# Patient Record
Sex: FEMALE | Race: WHITE | HISPANIC OR LATINO | Employment: UNEMPLOYED | URBAN - METROPOLITAN AREA
[De-identification: names, ages, dates, MRNs, and addresses within clinical notes are randomized per-mention and may not be internally consistent; named-entity substitution may affect disease eponyms.]

---

## 2018-10-23 ENCOUNTER — TELEPHONE (OUTPATIENT)
Dept: PLASTIC SURGERY | Facility: CLINIC | Age: 38
End: 2018-10-23

## 2018-10-23 NOTE — TELEPHONE ENCOUNTER
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     ----- Message from Kylee Reynaga sent at 10/23/2018  4:27 PM CDT -----  Contact: pt's  "Alton" [phone]  Pt's  called after [facility] gave him Dr Moore's name for his wife to have a breast reduction.  He is asking for a call to let him know either way if this is a possibility or not.  [phone]    Thanks  eliud    "

## 2020-06-24 ENCOUNTER — TELEPHONE (OUTPATIENT)
Dept: PODIATRY | Facility: CLINIC | Age: 40
End: 2020-06-24

## 2020-06-24 NOTE — TELEPHONE ENCOUNTER
----- Message from Carolina Waters sent at 6/24/2020  1:24 PM CDT -----  Regarding: sending referral [facility]  Contact: Alton/  Type: Needs Medical Advice  Who Called:  Alton  Uriel Call Back Number: [phone]  Additional Information: Patient has Authorization from  [facility]#[id number]. Patient will have Referral faxed to [phone].  Please call to advise and schedule.  Thanks!

## 2020-06-24 NOTE — TELEPHONE ENCOUNTER
Advised pt's  once the referral with approved authorizations was received in the office we will call and schedule appointment. He verbalized understanding

## 2020-07-08 ENCOUNTER — OFFICE VISIT (OUTPATIENT)
Dept: PODIATRY | Facility: CLINIC | Age: 40
End: 2020-07-08
Payer: OTHER GOVERNMENT

## 2020-07-08 VITALS
RESPIRATION RATE: 19 BRPM | OXYGEN SATURATION: 98 % | DIASTOLIC BLOOD PRESSURE: 67 MMHG | SYSTOLIC BLOOD PRESSURE: 110 MMHG | HEIGHT: 66 IN | WEIGHT: 137 LBS | HEART RATE: 83 BPM | BODY MASS INDEX: 22.02 KG/M2 | TEMPERATURE: 98 F

## 2020-07-08 DIAGNOSIS — M72.2 PLANTAR FASCIITIS: Primary | ICD-10-CM

## 2020-07-08 PROCEDURE — 99203 OFFICE O/P NEW LOW 30 MIN: CPT | Mod: S$PBB,,, | Performed by: PODIATRIST

## 2020-07-08 PROCEDURE — 99203 PR OFFICE/OUTPT VISIT, NEW, LEVL III, 30-44 MIN: ICD-10-PCS | Mod: S$PBB,,, | Performed by: PODIATRIST

## 2020-07-08 PROCEDURE — 99999 PR PBB SHADOW E&M-EST. PATIENT-LVL III: CPT | Mod: PBBFAC,,, | Performed by: PODIATRIST

## 2020-07-08 PROCEDURE — 99999 PR PBB SHADOW E&M-EST. PATIENT-LVL III: ICD-10-PCS | Mod: PBBFAC,,, | Performed by: PODIATRIST

## 2020-07-08 PROCEDURE — 99213 OFFICE O/P EST LOW 20 MIN: CPT | Mod: PBBFAC | Performed by: PODIATRIST

## 2020-07-08 RX ORDER — DICLOFENAC SODIUM 75 MG/1
75 TABLET, DELAYED RELEASE ORAL 2 TIMES DAILY
Qty: 60 TABLET | Refills: 1 | Status: SHIPPED | OUTPATIENT
Start: 2020-07-08 | End: 2020-08-06 | Stop reason: ALTCHOICE

## 2020-07-08 RX ORDER — METHYLPREDNISOLONE 4 MG/1
TABLET ORAL
COMMUNITY
Start: 2020-05-28 | End: 2020-08-06

## 2020-07-08 RX ORDER — AMITRIPTYLINE HYDROCHLORIDE 25 MG/1
TABLET, FILM COATED ORAL
COMMUNITY
Start: 2020-06-17

## 2020-07-11 NOTE — PROGRESS NOTES
Subjective:       Patient ID: Brissa Araujo is a 40 y.o. female.    Chief Complaint: Foot Pain (mostly in rt foot but lt foot is starting to hurt as well)   Patient presents today she is complaining of bilateral foot pain right greater than left she states that this has been bothering her since 2015 however it got significantly worse in March patient states she is a runner she does like to exercise and work out she has not been running in quite a while because of this discomfort she states that she was on oral steroids this did not help she had x-rays of the area she has tried ice elevation stretching rolling a cold water bottle underneath her arch none of this has really helped.  Patient has tried Motrin and naproxen all of which has not helped.    History reviewed. No pertinent past medical history.  History reviewed. No pertinent surgical history.  Family History   Problem Relation Age of Onset    Diabetes Father      Social History     Socioeconomic History    Marital status:      Spouse name: Not on file    Number of children: Not on file    Years of education: Not on file    Highest education level: Not on file   Occupational History    Not on file   Social Needs    Financial resource strain: Not on file    Food insecurity     Worry: Not on file     Inability: Not on file    Transportation needs     Medical: Not on file     Non-medical: Not on file   Tobacco Use    Smoking status: Never Smoker    Smokeless tobacco: Never Used   Substance and Sexual Activity    Alcohol use: Yes     Comment: occaisonally    Drug use: Never    Sexual activity: Not Currently   Lifestyle    Physical activity     Days per week: Not on file     Minutes per session: Not on file    Stress: Not on file   Relationships    Social connections     Talks on phone: Not on file     Gets together: Not on file     Attends Spiritism service: Not on file     Active member of club or organization: Not on file     Attends  "meetings of clubs or organizations: Not on file     Relationship status: Not on file   Other Topics Concern    Not on file   Social History Narrative    Not on file       Current Outpatient Medications   Medication Sig Dispense Refill    amitriptyline (ELAVIL) 25 MG tablet       methylPREDNISolone (MEDROL DOSEPACK) 4 mg tablet       diclofenac (VOLTAREN) 75 MG EC tablet Take 1 tablet (75 mg total) by mouth 2 (two) times daily. 60 tablet 1     No current facility-administered medications for this visit.      Review of patient's allergies indicates:  No Known Allergies    Review of Systems   Musculoskeletal: Positive for arthralgias.   All other systems reviewed and are negative.      Objective:      Vitals:    07/08/20 1000   BP: 110/67   Pulse: 83   Resp: 19   Temp: 98 °F (36.7 °C)   TempSrc: Temporal   SpO2: 98%   Weight: 62.1 kg (137 lb)   Height: 5' 6" (1.676 m)     Physical Exam  Vitals signs and nursing note reviewed.   Constitutional:       Appearance: Normal appearance.   Cardiovascular:      Pulses:           Dorsalis pedis pulses are 2+ on the right side and 2+ on the left side.        Posterior tibial pulses are 2+ on the right side and 2+ on the left side.   Pulmonary:      Effort: Pulmonary effort is normal.   Musculoskeletal: Normal range of motion.        Feet:    Feet:      Right foot:      Protective Sensation: 2 sites tested. 2 sites sensed.      Skin integrity: Erythema and warmth present.      Left foot:      Protective Sensation: 2 sites tested. 2 sites sensed.      Skin integrity: Erythema and warmth present.   Skin:     General: Skin is warm.   Neurological:      General: No focal deficit present.      Mental Status: She is alert.   Psychiatric:         Mood and Affect: Mood normal.         Behavior: Behavior normal.         Thought Content: Thought content normal.         Judgment: Judgment normal.              Assessment:       1. Plantar fasciitis        Plan:        Patient presents " today she is complaining of bilateral foot pain right greater than left she states that this has been bothering her since 2015 however it got significantly worse in March patient states she is a runner she does like to exercise and work out she has not been running in quite a while because of this discomfort she states that she was on oral steroids this did not help she had x-rays of the area she has tried ice elevation stretching rolling a cold water bottle underneath her arch none of this has really helped.  Patient has tried Motrin and naproxen all of which has not helped.  On evaluation patient does have significantly elevated arches both weight-bearing and nonweightbearing bilateral she states that she does have pain all day long it is especially bad after she has been sitting or when she 1st gets up in the morning.  Following a lengthy evaluation and discussion patient does have findings consistent with plantar fasciitis bilateral obviously this has been going on for quite a while she has not gotten the proper treatment I have recommended appropriate arch supports I have recommended power step arch supports control to start I have also started the patient on diclofenac I have advised her this increased arch is going to make a difference reducing or inflammation and her pain she may need additional support we will re-evaluate her when I follow up with her in 3 weeks.  Patient advised how to take the diclofenac as directed if she has any problems questions or concerns prior to her scheduled follow-up she is to contact me.  Total face-to-face time including discussion evaluation treatment equaled 30 min.  X-rays were not taken today I explained to the patient this is a soft tissue problem with the plantar fascial ligament and x-rays would not change my treatment course at this time.This note was created using [employer] voice recognition software that occasionally misinterpreted phrases or words.

## 2020-07-29 ENCOUNTER — OFFICE VISIT (OUTPATIENT)
Dept: PODIATRY | Facility: CLINIC | Age: 40
End: 2020-07-29
Payer: OTHER GOVERNMENT

## 2020-07-29 VITALS
OXYGEN SATURATION: 99 % | DIASTOLIC BLOOD PRESSURE: 64 MMHG | SYSTOLIC BLOOD PRESSURE: 99 MMHG | TEMPERATURE: 97 F | HEART RATE: 72 BPM | RESPIRATION RATE: 16 BRPM

## 2020-07-29 DIAGNOSIS — M72.2 PLANTAR FASCIITIS: Primary | ICD-10-CM

## 2020-07-29 PROCEDURE — 99213 OFFICE O/P EST LOW 20 MIN: CPT | Mod: PBBFAC | Performed by: PODIATRIST

## 2020-07-29 PROCEDURE — 99214 PR OFFICE/OUTPT VISIT, EST, LEVL IV, 30-39 MIN: ICD-10-PCS | Mod: S$PBB,,, | Performed by: PODIATRIST

## 2020-07-29 PROCEDURE — 99999 PR PBB SHADOW E&M-EST. PATIENT-LVL III: CPT | Mod: PBBFAC,,, | Performed by: PODIATRIST

## 2020-07-29 PROCEDURE — 99999 PR PBB SHADOW E&M-EST. PATIENT-LVL III: ICD-10-PCS | Mod: PBBFAC,,, | Performed by: PODIATRIST

## 2020-07-29 PROCEDURE — 99214 OFFICE O/P EST MOD 30 MIN: CPT | Mod: S$PBB,,, | Performed by: PODIATRIST

## 2020-08-02 NOTE — PROGRESS NOTES
Subjective:       Patient ID: Brissa Araujo is a 40 y.o. female.    Chief Complaint: Plantar Fasciitis (bilat)   Patient presents today she is complaining of bilateral foot pain right greater than left she states that this has been bothering her since 2015 however it got significantly worse in March patient states she is a runner she does like to exercise and work out she has not been running in quite a while because of this discomfort she states that she was on oral steroids this did not help she had x-rays of the area she has tried ice elevation stretching rolling a cold water bottle underneath her arch none of this has really helped.  Patient relates only 20% improvement.    History reviewed. No pertinent past medical history.  History reviewed. No pertinent surgical history.  Family History   Problem Relation Age of Onset    Diabetes Father      Social History     Socioeconomic History    Marital status:      Spouse name: Not on file    Number of children: Not on file    Years of education: Not on file    Highest education level: Not on file   Occupational History    Not on file   Social Needs    Financial resource strain: Not on file    Food insecurity     Worry: Not on file     Inability: Not on file    Transportation needs     Medical: Not on file     Non-medical: Not on file   Tobacco Use    Smoking status: Never Smoker    Smokeless tobacco: Never Used   Substance and Sexual Activity    Alcohol use: Yes     Comment: occaisonally    Drug use: Never    Sexual activity: Not Currently   Lifestyle    Physical activity     Days per week: Not on file     Minutes per session: Not on file    Stress: Not on file   Relationships    Social connections     Talks on phone: Not on file     Gets together: Not on file     Attends [employer] service: Not on file     Active member of club or organization: Not on file     Attends meetings of clubs or organizations: Not on file     Relationship status:  Not on file   Other Topics Concern    Not on file   Social History Narrative    Not on file       Current Outpatient Medications   Medication Sig Dispense Refill    amitriptyline (ELAVIL) 25 MG tablet       diclofenac (VOLTAREN) 75 MG EC tablet Take 1 tablet (75 mg total) by mouth 2 (two) times daily. 60 tablet 1    methylPREDNISolone (MEDROL DOSEPACK) 4 mg tablet        No current facility-administered medications for this visit.      Review of patient's allergies indicates:  No Known Allergies    Review of Systems   Musculoskeletal: Positive for arthralgias.   All other systems reviewed and are negative.      Objective:      Vitals:    07/29/20 0959   BP: 99/64   BP Location: Right arm   Patient Position: Sitting   BP Method: Medium (Automatic)   Pulse: 72   Resp: 16   Temp: 97.4 °F (36.3 °C)   TempSrc: Tympanic   SpO2: 99%     Physical Exam  Vitals signs and nursing note reviewed.   Constitutional:       Appearance: Normal appearance.   Cardiovascular:      Pulses:           Dorsalis pedis pulses are 2+ on the right side and 2+ on the left side.        Posterior tibial pulses are 2+ on the right side and 2+ on the left side.   Pulmonary:      Effort: Pulmonary effort is normal.   Musculoskeletal: Normal range of motion.        Feet:    Feet:      Right foot:      Protective Sensation: 2 sites tested. 2 sites sensed.      Skin integrity: Erythema and warmth present.      Left foot:      Protective Sensation: 2 sites tested. 2 sites sensed.      Skin integrity: Erythema and warmth present.   Skin:     General: Skin is warm.   Neurological:      General: No focal deficit present.      Mental Status: She is alert.   Psychiatric:         Mood and Affect: Mood normal.         Behavior: Behavior normal.         Thought Content: Thought content normal.         Judgment: Judgment normal.              Assessment:       1. Plantar fasciitis        Plan:        Patient presents for follow-up today she is complaining of  bilateral foot pain right greater than left she states that this has been bothering her since 2015 however it got significantly worse in March patient states she is a runner she does like to exercise and work out she has not been running in quite a while because of this discomfort she states that she was on oral steroids this did not help she had x-rays of the area she has tried ice elevation stretching rolling a cold water bottle underneath her arch none of this has really helped.    Patient relates only a 20% improvement she is taking the diclofenac as directed she is wearing the arch supports all the time she states she has been walking about 3 miles not having much discomfort however after that she is having significant increased discomfort and pain.  I have advised the patient obviously the power steps are not giving her enough arch support I did add additional blue arch padding to the plantar arch bilateral want see how the patient does with this I have given her extra pads and shown her where to apply these to the dorsal arch if needed I would advise her to try to get used to the plantar arch support 1st before adding any additional patient was in understanding and agreement with this today.  Patient will continue diclofenac try the increased arch support if she is not doing a lot better over the next few weeks she is to contact me for further evaluation and treatment I did discuss the possibility that she may need custom-molded orthotics to get her the appropriate support that she needs but will see how she does with the additional over-the-counter arch support 1st.  Face-to-face time equaled 25 min.  Additions made to the patient's arch support with additional adjustments.  This note was created using [employer] voice recognition software that occasionally misinterpreted phrases or words.

## 2020-08-06 ENCOUNTER — OFFICE VISIT (OUTPATIENT)
Dept: PODIATRY | Facility: CLINIC | Age: 40
End: 2020-08-06
Payer: OTHER GOVERNMENT

## 2020-08-06 VITALS
SYSTOLIC BLOOD PRESSURE: 110 MMHG | BODY MASS INDEX: 21.69 KG/M2 | HEART RATE: 77 BPM | HEIGHT: 66 IN | WEIGHT: 135 LBS | TEMPERATURE: 98 F | DIASTOLIC BLOOD PRESSURE: 60 MMHG

## 2020-08-06 DIAGNOSIS — M79.672 HEEL PAIN, BILATERAL: ICD-10-CM

## 2020-08-06 DIAGNOSIS — M72.2 PLANTAR FASCIITIS: Primary | ICD-10-CM

## 2020-08-06 DIAGNOSIS — M79.671 HEEL PAIN, BILATERAL: ICD-10-CM

## 2020-08-06 PROCEDURE — 96372 THER/PROPH/DIAG INJ SC/IM: CPT | Mod: PBBFAC,PN

## 2020-08-06 PROCEDURE — 99999 PR PBB SHADOW E&M-EST. PATIENT-LVL III: CPT | Mod: PBBFAC,,, | Performed by: PODIATRIST

## 2020-08-06 PROCEDURE — 99213 OFFICE O/P EST LOW 20 MIN: CPT | Mod: PBBFAC,PN,25 | Performed by: PODIATRIST

## 2020-08-06 PROCEDURE — 99214 OFFICE O/P EST MOD 30 MIN: CPT | Mod: S$PBB,,, | Performed by: PODIATRIST

## 2020-08-06 PROCEDURE — 99999 PR PBB SHADOW E&M-EST. PATIENT-LVL III: ICD-10-PCS | Mod: PBBFAC,,, | Performed by: PODIATRIST

## 2020-08-06 PROCEDURE — 99214 PR OFFICE/OUTPT VISIT, EST, LEVL IV, 30-39 MIN: ICD-10-PCS | Mod: S$PBB,,, | Performed by: PODIATRIST

## 2020-08-06 RX ORDER — BETAMETHASONE SODIUM PHOSPHATE AND BETAMETHASONE ACETATE 3; 3 MG/ML; MG/ML
12 INJECTION, SUSPENSION INTRA-ARTICULAR; INTRALESIONAL; INTRAMUSCULAR; SOFT TISSUE
Status: COMPLETED | OUTPATIENT
Start: 2020-08-06 | End: 2020-08-06

## 2020-08-06 RX ORDER — KETOROLAC TROMETHAMINE 30 MG/ML
30 INJECTION, SOLUTION INTRAMUSCULAR; INTRAVENOUS
Status: COMPLETED | OUTPATIENT
Start: 2020-08-06 | End: 2020-08-06

## 2020-08-06 RX ORDER — CELECOXIB 200 MG/1
200 CAPSULE ORAL 2 TIMES DAILY
Qty: 60 CAPSULE | Refills: 2 | Status: SHIPPED | OUTPATIENT
Start: 2020-08-06 | End: 2020-09-05

## 2020-08-06 RX ADMIN — KETOROLAC TROMETHAMINE 30 MG: 30 INJECTION, SOLUTION INTRAMUSCULAR; INTRAVENOUS at 04:08

## 2020-08-06 RX ADMIN — BETAMETHASONE SODIUM PHOSPHATE AND BETAMETHASONE ACETATE 12 MG: 3; 3 INJECTION, SUSPENSION INTRA-ARTICULAR; INTRALESIONAL; INTRAMUSCULAR at 04:08

## 2020-08-09 PROBLEM — M79.671 HEEL PAIN, BILATERAL: Status: ACTIVE | Noted: 2020-08-09

## 2020-08-09 PROBLEM — M79.672 HEEL PAIN, BILATERAL: Status: ACTIVE | Noted: 2020-08-09

## 2020-08-09 NOTE — PROGRESS NOTES
Subjective:       Patient ID: Brissa Araujo is a 40 y.o. female.    Chief Complaint: Follow-up, Foot Problem, and Foot Pain   Patient presents today she is complaining of bilateral foot pain right greater than left she states that this has been bothering her since 2015 however the patient states it got better with some simple conservative treatment now that it flared up again in March it has not improved.  Patient was about 20% better with the arch support and anti-inflammatories but she states she thinks the extra arch that she applied to the power steps may have made this worse.  History reviewed. No pertinent past medical history.  Past Surgical History:   Procedure Laterality Date    BREAST SURGERY       Family History   Problem Relation Age of Onset    Diabetes Father      Social History     Socioeconomic History    Marital status:      Spouse name: Not on file    Number of children: Not on file    Years of education: Not on file    Highest education level: Not on file   Occupational History    Not on file   Social Needs    Financial resource strain: Not on file    Food insecurity     Worry: Not on file     Inability: Not on file    Transportation needs     Medical: Not on file     Non-medical: Not on file   Tobacco Use    Smoking status: Never Smoker    Smokeless tobacco: Never Used   Substance and Sexual Activity    Alcohol use: Yes     Comment: occaisonally    Drug use: Never    Sexual activity: Not Currently   Lifestyle    Physical activity     Days per week: Not on file     Minutes per session: Not on file    Stress: Not on file   Relationships    Social connections     Talks on phone: Not on file     Gets together: Not on file     Attends Mu-ism service: Not on file     Active member of club or organization: Not on file     Attends meetings of clubs or organizations: Not on file     Relationship status: Not on file   Other Topics Concern    Not on file   Social History  "Narrative    Not on file       Current Outpatient Medications   Medication Sig Dispense Refill    amitriptyline (ELAVIL) 25 MG tablet       celecoxib (CELEBREX) 200 MG capsule Take 1 capsule (200 mg total) by mouth 2 (two) times daily. 60 capsule 2     No current facility-administered medications for this visit.      Review of patient's allergies indicates:  No Known Allergies    Review of Systems   Musculoskeletal: Positive for arthralgias.   All other systems reviewed and are negative.      Objective:      Vitals:    08/06/20 1542   BP: 110/60   Pulse: 77   Temp: 98.1 °F (36.7 °C)   Weight: 61.2 kg (135 lb)   Height: 5' 6" (1.676 m)     Physical Exam  Vitals signs and nursing note reviewed.   Constitutional:       Appearance: Normal appearance.   Cardiovascular:      Pulses:           Dorsalis pedis pulses are 2+ on the right side and 2+ on the left side.        Posterior tibial pulses are 2+ on the right side and 2+ on the left side.   Pulmonary:      Effort: Pulmonary effort is normal.   Musculoskeletal: Normal range of motion.        Feet:    Feet:      Right foot:      Protective Sensation: 2 sites tested. 2 sites sensed.      Skin integrity: Erythema and warmth present.      Left foot:      Protective Sensation: 2 sites tested. 2 sites sensed.      Skin integrity: Erythema and warmth present.   Skin:     General: Skin is warm.   Neurological:      General: No focal deficit present.      Mental Status: She is alert.   Psychiatric:         Mood and Affect: Mood normal.         Behavior: Behavior normal.         Thought Content: Thought content normal.         Judgment: Judgment normal.              Assessment:       1. Plantar fasciitis    2. Heel pain, bilateral        Plan:        Patient presents today she is complaining of bilateral foot pain right greater than left she states that this has been bothering her since 2015 however the patient states it got better with some simple conservative treatment now " that it flared up again in March it has not improved.  Patient was about 20% better with the arch support and anti-inflammatories but she states she thinks the extra arch that she applied to the power steps may have made this worse.  Patient had been wearing power step arch supports I added additional blue arch padding to the plantar arch bilateral and advised the patient to wear these for a while over the next couple weeks she should continue to improve if not she was going to apply a more aggressive blue arch pad to the dorsal arch bilateral however the patient waited several days apply the additional padding and states that she believes it caused more discomfort.  Patient is tolerating the diclofenac but she does not feel that it is helping.  Because of COVID-19 patient's  was not able to come into the exam room the patient was concerned there may be an issue with understanding which she was trying to discuss with me therefore she had him on speaker phone I did advised the patient I understood which she was saying and the discomfort and symptom she was trying to convey to me.  I assured the patient and the patient's  that her findings are consistent with plantar fasciitis patient's  indicate indicated he had done some research on the Internet I advised them not all patients are the same as described on the Internet.  Patient and the patient's  were understanding with this I have advised them I am going to change the patient's anti-inflammatory she is going to did not discontinue the diclofenac I am going to start her on Celebrex 200 mg twice a day since the diclofenac was not really helping her additionally I have ordered plain film x-rays of the patient's right foot the patient apparently had x-rays performed on the  base however they did not for with these x-rays nor did they forward and x-ray report to us patient states they indicated that there was a spur at that time.  I  did advised the patient previously that the spurs very common and the spurs not the cause of the pain the pain is related to the plantar fascial ligament and inflammation in the plantar fascial ligament.  I did discuss stress fracture advising the patient the patient's  while this is a concern it is not a very likely finding because the patient does not have constant severe pain all the time and it is actually not as bad when she 1st gets up in the morning and she does not have the swelling in the heel region right foot to correlate with the stress fracture.  I have ordered a plain film x-ray for evaluation of the patient's right heel patient was administered an IM injection of Celestone on the right side Toradol in the left side advising her I want her to wear the arch supports without the additional support and I am going to see her for follow-up in 1 week to see how she is responding we can review the x-rays at that time.  Face-to-face time including discussion evaluation review of past medical history treatment plan and discussion with the patient's  via phone equaled 25 min.  I did advised the patient during the visit that there was not a language barrier and I did understand what she was trying to convey to me on each of her previous visits.  This note was created using [employer] voice recognition software that occasionally misinterpreted phrases or words.

## 2020-08-11 ENCOUNTER — TELEPHONE (OUTPATIENT)
Dept: PODIATRY | Facility: CLINIC | Age: 40
End: 2020-08-11

## 2020-08-11 NOTE — TELEPHONE ENCOUNTER
Advised pt's  they should be notified if there are any issues with scheduled xray, he verbalized understanding

## 2020-08-11 NOTE — TELEPHONE ENCOUNTER
----- Message from Anastasia Rasheed sent at 8/11/2020  2:03 PM CDT -----  Regarding: authorization for xrays  Contact: Alton juarez  Type: Needs Medical Advice  Who Called:  Alton juarez  Best Call Back Number: [phone]  Additional Information: Pls call pt to adv if  gave the auth for the xrays to be done. They do not want to get there and be turned away due to no auth

## 2020-08-12 ENCOUNTER — HOSPITAL ENCOUNTER (OUTPATIENT)
Dept: RADIOLOGY | Facility: HOSPITAL | Age: 40
Discharge: HOME | End: 2020-08-12
Attending: PODIATRIST
Payer: OTHER GOVERNMENT

## 2020-08-12 DIAGNOSIS — M72.2 PLANTAR FASCIITIS: ICD-10-CM

## 2020-08-12 PROCEDURE — 73630 X-RAY EXAM OF FOOT: CPT | Mod: 26,RT,, | Performed by: RADIOLOGY

## 2020-08-12 PROCEDURE — 73630 XR FOOT COMPLETE 3 VIEW RIGHT: ICD-10-PCS | Mod: 26,RT,, | Performed by: RADIOLOGY

## 2020-08-12 PROCEDURE — 73630 X-RAY EXAM OF FOOT: CPT | Mod: TC,PN,RT

## 2020-08-13 ENCOUNTER — OFFICE VISIT (OUTPATIENT)
Dept: PODIATRY | Facility: CLINIC | Age: 40
End: 2020-08-13
Payer: OTHER GOVERNMENT

## 2020-08-13 VITALS
TEMPERATURE: 99 F | WEIGHT: 135 LBS | DIASTOLIC BLOOD PRESSURE: 55 MMHG | HEART RATE: 75 BPM | HEIGHT: 66 IN | SYSTOLIC BLOOD PRESSURE: 107 MMHG | BODY MASS INDEX: 21.69 KG/M2

## 2020-08-13 DIAGNOSIS — M72.2 PLANTAR FASCIITIS: ICD-10-CM

## 2020-08-13 DIAGNOSIS — M79.671 HEEL PAIN, BILATERAL: ICD-10-CM

## 2020-08-13 DIAGNOSIS — M79.672 HEEL PAIN, BILATERAL: ICD-10-CM

## 2020-08-13 DIAGNOSIS — M62.171 NONTRAUMATIC RUPTURE OF PLANTAR FASCIA OF RIGHT FOOT: Primary | ICD-10-CM

## 2020-08-13 PROCEDURE — 99213 OFFICE O/P EST LOW 20 MIN: CPT | Mod: PBBFAC,PN | Performed by: PODIATRIST

## 2020-08-13 PROCEDURE — 99999 PR PBB SHADOW E&M-EST. PATIENT-LVL III: ICD-10-PCS | Mod: PBBFAC,,, | Performed by: PODIATRIST

## 2020-08-13 PROCEDURE — 99999 PR PBB SHADOW E&M-EST. PATIENT-LVL III: CPT | Mod: PBBFAC,,, | Performed by: PODIATRIST

## 2020-08-13 PROCEDURE — 99214 OFFICE O/P EST MOD 30 MIN: CPT | Mod: S$PBB,,, | Performed by: PODIATRIST

## 2020-08-13 PROCEDURE — 99214 PR OFFICE/OUTPT VISIT, EST, LEVL IV, 30-39 MIN: ICD-10-PCS | Mod: S$PBB,,, | Performed by: PODIATRIST

## 2020-08-13 NOTE — LETTER
August 16, 2020      [address] [place] [address]           [facility] - Podiatry/Wound Care  [facility] [facility] [place] [address]  Phone: [phone]  Fax: [phone]          Patient: Brissa Araujo   MR Number: [id number]   YOB: 1980   Date of Visit: 8/13/2020       Dear [facility]:    Thank you for referring Brissa Araujo to me for evaluation. Attached you will find relevant portions of my assessment and plan of care.    If you have questions, please do not hesitate to call me. I look forward to following Brissa Araujo along with you.    Sincerely,    Jean Jimenez, NIRMAL    Enclosure  CC:  No Recipients    If you would like to receive this communication electronically, please contact [email or web address] or [phone] to request more information on [employer] access.    For providers and/or their staff who would like to refer a patient to [facility], please contact us through our one-stop-shop provider referral line, [facility], at [phone].    If you feel you have received this communication in error or would no longer like to receive these types of communications, please e-mail [email or web address]

## 2020-08-16 NOTE — PROGRESS NOTES
Subjective:       Patient ID: Brissa Araujo is a 40 y.o. female.    Chief Complaint: Follow-up, Foot Pain, and Foot Problem   Patient presents today she is complaining of bilateral foot pain right greater than left she states that this has been bothering her since 2015 however the patient states it got better with some simple conservative treatment now that it flared up again in March it has not improved.    History reviewed. No pertinent past medical history.  Past Surgical History:   Procedure Laterality Date    BREAST SURGERY       Family History   Problem Relation Age of Onset    Diabetes Father      Social History     Socioeconomic History    Marital status:      Spouse name: Not on file    Number of children: Not on file    Years of education: Not on file    Highest education level: Not on file   Occupational History    Not on file   Social Needs    Financial resource strain: Not on file    Food insecurity     Worry: Not on file     Inability: Not on file    Transportation needs     Medical: Not on file     Non-medical: Not on file   Tobacco Use    Smoking status: Never Smoker    Smokeless tobacco: Never Used   Substance and Sexual Activity    Alcohol use: Yes     Comment: occaisonally    Drug use: Never    Sexual activity: Not Currently   Lifestyle    Physical activity     Days per week: Not on file     Minutes per session: Not on file    Stress: Not on file   Relationships    Social connections     Talks on phone: Not on file     Gets together: Not on file     Attends Shinto service: Not on file     Active member of club or organization: Not on file     Attends meetings of clubs or organizations: Not on file     Relationship status: Not on file   Other Topics Concern    Not on file   Social History Narrative    Not on file       Current Outpatient Medications   Medication Sig Dispense Refill    amitriptyline (ELAVIL) 25 MG tablet       celecoxib (CELEBREX) 200 MG capsule  "Take 1 capsule (200 mg total) by mouth 2 (two) times daily. 60 capsule 2     No current facility-administered medications for this visit.      Review of patient's allergies indicates:  No Known Allergies    Review of Systems   Musculoskeletal: Positive for arthralgias.   All other systems reviewed and are negative.      Objective:      Vitals:    08/13/20 0956   BP: (!) 107/55   Pulse: 75   Temp: 98.7 °F (37.1 °C)   Weight: 61.2 kg (135 lb)   Height: 5' 6" (1.676 m)     Physical Exam  Vitals signs and nursing note reviewed.   Constitutional:       Appearance: Normal appearance.   Cardiovascular:      Pulses:           Dorsalis pedis pulses are 2+ on the right side and 2+ on the left side.        Posterior tibial pulses are 2+ on the right side and 2+ on the left side.   Pulmonary:      Effort: Pulmonary effort is normal.   Musculoskeletal: Normal range of motion.        Feet:    Feet:      Right foot:      Protective Sensation: 2 sites tested. 2 sites sensed.      Skin integrity: Erythema and warmth present.      Left foot:      Protective Sensation: 2 sites tested. 2 sites sensed.      Skin integrity: Erythema and warmth present.   Skin:     General: Skin is warm.   Neurological:      General: No focal deficit present.      Mental Status: She is alert.   Psychiatric:         Mood and Affect: Mood normal.         Behavior: Behavior normal.         Thought Content: Thought content normal.         Judgment: Judgment normal.              Assessment:       1. Nontraumatic rupture of plantar fascia of right foot    2. Plantar fasciitis    3. Heel pain, bilateral        Plan:        Patient presents today she is complaining of bilateral foot pain right greater than left she states that this has been bothering her since 2015 however the patient states it got better with some simple conservative treatment now that it flared up again in March it has not improved.  Patient states she is not really doing any better the heel " pads may be helped a little bit but not significantly I did review her x-rays with her at length and in detail there is no signs of obvious stress fracture there is no plantar calcaneal spur noted I have advised her obviously all of her discomfort is related to the plantar fascial ligament the fact that she has not shown signs of significant improvement with conservative care does raise concern for a partial tear of the plantar fascia especially because she was actively running when this started to bother her and has not really responded well to conservative care.  I have ordered an MRI without contrast right to evaluate the plantar fascial ligament.  Patient additionally states that starting her on Celebrex really did not change anything and really did not help her or decrease her discomfort.  I am going to have the patient continue to wear the power steps as directed will wait till we get the results of the MRI so that I can review these with her to determine the next course of treatment we may have to consider custom-molded orthotics for the patient down the road assuming there is no plantar fascial tear in all of her discomfort is related to plantar fasciitis we can also consider a steroid injection but will defer this until we get the results of the MRI MRI has been ordered and will be scheduled upon insurance approval patient's activities are still to be limited no running she can walk as tolerated.  Face-to-face time including discussion evaluation discussion of treatment plan review of x-rays equaled 25 min.  This note was created using [employer] voice recognition software that occasionally misinterpreted phrases or words.

## 2020-08-19 ENCOUNTER — TELEPHONE (OUTPATIENT)
Dept: PODIATRY | Facility: CLINIC | Age: 40
End: 2020-08-19

## 2020-08-19 NOTE — TELEPHONE ENCOUNTER
----- Message from Adore Stark sent at 8/19/2020  9:49 AM CDT -----  Echo mustafa/[facility] [phone]    Patient is calling to schedule an MRI @ [facility], please fax the order to [phone].  Thank you!

## 2020-08-20 ENCOUNTER — TELEPHONE (OUTPATIENT)
Dept: PODIATRY | Facility: CLINIC | Age: 40
End: 2020-08-20

## 2020-08-20 NOTE — TELEPHONE ENCOUNTER
Spoke to Kylee and advised order was sent yesterday but they did not receive. Order was faxed to them again and I gave her my direct number to call if they did not receive.

## 2020-08-20 NOTE — TELEPHONE ENCOUNTER
----- Message from Eliseo Nazario sent at 8/20/2020  9:58 AM CDT -----  Contact: pt  Type: Needs Medical Advice  Who Called:  pt    Best Call Back Number: [phone] (home)     Phone: [phone] [facility]    Additional Information: pt went to get her MRI yesterday but was refused due to needing to know which foot is to be scanned. Please call [facility] to advise. Please call pt to inform when she is able to schedule with them.

## 2020-08-25 ENCOUNTER — TELEPHONE (OUTPATIENT)
Dept: PODIATRY | Facility: CLINIC | Age: 40
End: 2020-08-25

## 2020-08-25 NOTE — TELEPHONE ENCOUNTER
----- Message from Ave Altamirano sent at 8/25/2020  8:32 AM CDT -----  Type: Needs MRI resent with correction    Who Called:   (Alton) with Patient  Best Call Back Number: [phone]  Additional Information:  Patient had order sent to [facility] for MRI by doctor/stated they have been trying for two weeks to get order corrected (does not state which foot it is for)please resend and call patient back to advise.

## 2020-09-01 ENCOUNTER — OFFICE VISIT (OUTPATIENT)
Dept: PODIATRY | Facility: CLINIC | Age: 40
End: 2020-09-01
Payer: OTHER GOVERNMENT

## 2020-09-01 VITALS
DIASTOLIC BLOOD PRESSURE: 73 MMHG | HEART RATE: 77 BPM | SYSTOLIC BLOOD PRESSURE: 113 MMHG | TEMPERATURE: 98 F | HEIGHT: 66 IN | WEIGHT: 135 LBS | BODY MASS INDEX: 21.69 KG/M2

## 2020-09-01 DIAGNOSIS — M72.2 PLANTAR FASCIITIS: ICD-10-CM

## 2020-09-01 DIAGNOSIS — M79.671 HEEL PAIN, BILATERAL: ICD-10-CM

## 2020-09-01 DIAGNOSIS — M62.171 NONTRAUMATIC RUPTURE OF PLANTAR FASCIA OF RIGHT FOOT: Primary | ICD-10-CM

## 2020-09-01 DIAGNOSIS — M79.672 HEEL PAIN, BILATERAL: ICD-10-CM

## 2020-09-01 PROCEDURE — 99999 PR PBB SHADOW E&M-EST. PATIENT-LVL III: ICD-10-PCS | Mod: PBBFAC,,, | Performed by: PODIATRIST

## 2020-09-01 PROCEDURE — 99214 OFFICE O/P EST MOD 30 MIN: CPT | Mod: S$PBB,,, | Performed by: PODIATRIST

## 2020-09-01 PROCEDURE — 99213 OFFICE O/P EST LOW 20 MIN: CPT | Mod: PBBFAC,PN | Performed by: PODIATRIST

## 2020-09-01 PROCEDURE — 99214 PR OFFICE/OUTPT VISIT, EST, LEVL IV, 30-39 MIN: ICD-10-PCS | Mod: S$PBB,,, | Performed by: PODIATRIST

## 2020-09-01 PROCEDURE — 99999 PR PBB SHADOW E&M-EST. PATIENT-LVL III: CPT | Mod: PBBFAC,,, | Performed by: PODIATRIST

## 2020-09-01 NOTE — LETTER
September 5, 2020      [address] [place] [address]           [facility] - Podiatry/Wound Care  [facility] [facility] [place] [address]  Phone: [phone]  Fax: [phone]          Patient: Brissa Araujo   MR Number: [id number]   YOB: 1980   Date of Visit: 9/1/2020       Dear [facility]:    Thank you for referring Brissa Araujo to me for evaluation. Attached you will find relevant portions of my assessment and plan of care.    If you have questions, please do not hesitate to call me. I look forward to following Brissa Araujo along with you.    Sincerely,    Jean Jimenez, NIRMAL    Enclosure  CC:  No Recipients    If you would like to receive this communication electronically, please contact [email or web address] or [phone] to request more information on [employer] Link access.    For providers and/or their staff who would like to refer a patient to [facility], please contact us through our one-stop-shop provider referral line, [facility], at [phone].    If you feel you have received this communication in error or would no longer like to receive these types of communications, please e-mail [email or web address]

## 2020-09-06 NOTE — PROGRESS NOTES
Subjective:       Patient ID: Brissa Araujo is a 40 y.o. female.    Chief Complaint: Follow-up, Foot Pain, and Foot Problem   Patient presents today she is complaining of bilateral foot pain right greater than left she states that this has been bothering her since 2015 however the patient states it got better with some simple conservative treatment now that it flared up again in March it has not improved.  Patient presents today to discuss the MRI results.  History reviewed. No pertinent past medical history.  Past Surgical History:   Procedure Laterality Date    BREAST SURGERY       Family History   Problem Relation Age of Onset    Diabetes Father      Social History     Socioeconomic History    Marital status:      Spouse name: Not on file    Number of children: Not on file    Years of education: Not on file    Highest education level: Not on file   Occupational History    Not on file   Social Needs    Financial resource strain: Not on file    Food insecurity     Worry: Not on file     Inability: Not on file    Transportation needs     Medical: Not on file     Non-medical: Not on file   Tobacco Use    Smoking status: Never Smoker    Smokeless tobacco: Never Used   Substance and Sexual Activity    Alcohol use: Yes     Comment: occaisonally    Drug use: Never    Sexual activity: Not Currently   Lifestyle    Physical activity     Days per week: Not on file     Minutes per session: Not on file    Stress: Not on file   Relationships    Social connections     Talks on phone: Not on file     Gets together: Not on file     Attends [employer] service: Not on file     Active member of club or organization: Not on file     Attends meetings of clubs or organizations: Not on file     Relationship status: Not on file   Other Topics Concern    Not on file   Social History Narrative    Not on file       Current Outpatient Medications   Medication Sig Dispense Refill    amitriptyline (ELAVIL) 25 MG  "tablet       celecoxib (CELEBREX) 200 MG capsule Take 1 capsule (200 mg total) by mouth 2 (two) times daily. 60 capsule 2     No current facility-administered medications for this visit.      Review of patient's allergies indicates:  No Known Allergies    Review of Systems   Musculoskeletal: Positive for arthralgias.   All other systems reviewed and are negative.      Objective:      Vitals:    09/01/20 1349   BP: 113/73   Pulse: 77   Temp: 98.4 °F (36.9 °C)   Weight: 61.2 kg (135 lb)   Height: 5' 6" (1.676 m)     Physical Exam  Vitals signs and nursing note reviewed.   Constitutional:       Appearance: Normal appearance.   Cardiovascular:      Pulses:           Dorsalis pedis pulses are 2+ on the right side and 2+ on the left side.        Posterior tibial pulses are 2+ on the right side and 2+ on the left side.   Pulmonary:      Effort: Pulmonary effort is normal.   Musculoskeletal: Normal range of motion.        Feet:    Feet:      Right foot:      Protective Sensation: 2 sites tested. 2 sites sensed.      Skin integrity: Erythema and warmth present.      Left foot:      Protective Sensation: 2 sites tested. 2 sites sensed.      Skin integrity: Erythema and warmth present.   Skin:     General: Skin is warm.   Neurological:      General: No focal deficit present.      Mental Status: She is alert.   Psychiatric:         Mood and Affect: Mood normal.         Behavior: Behavior normal.         Thought Content: Thought content normal.         Judgment: Judgment normal.              Assessment:       1. Nontraumatic rupture of plantar fascia of right foot    2. Plantar fasciitis    3. Heel pain, bilateral        Plan:       Patient presents today to discuss MRI results regarding the patient's right foot and severe pain on the plantar aspect of the right foot and at the plantar fascial insertion right.  Patient states that she is now starting to have pain on the left foot primarily on the outside of the left foot and " along the course of the peroneal tendons this is likely related to the patient compensating for her right foot pain it is something that we need to monitor and the patient needs to make sure she is wearing the Pro proper arch supports on the left foot also.  Patient is taking the Celebrex 200 mg twice a day as directed she still having a lot of discomfort lot of pain she has not been able to run or exercise the way she would like to.  Following evaluation I did discuss at length and in detail the MRI results with the patient and the patient's  who is on speaker phone I have advised them there are findings of a partial rupture of the plantar fascial ligament primarily the central band this is on the right foot this would explain the pop sensation that the patient fell when she was running and she has had significantly worse pain ever since that time additionally the radiologist noted a longitudinal split in the peroneus brevis patient has never had any injury in this area does not have any pain in this area I have advised her this is likely congenital it is something that we see very often and is not related to an injury.  Patient advised our primary focus is the plantar fascial tear right I did discuss at length in detail with the patient conservative treatment versus surgical treatment I have advised her it is not a ligament thickened just surgically be repaired typically the ligament is released from its insertion on the calcaneus to relieve the chronic pulling discomfort as well as the area of scar tissue that is lead to continued discomfort I have advised the patient this could lead to 6 months of postoperative pain while she recovers and she would need to be nonweightbearing for least 2 weeks possibly 4 weeks I have advised the patient this would definitely be a last resort in her treatment.  I am recommending conservative treatment 1st we can always pursue the surgical intervention if needed I advised  the patient she needs to be immobilized with her arch support in a fracture boot for the next 3 weeks and if she is doing well with less discomfort a half a week leading up to her follow-up I want her to start try to transition into a regular shoe with her arch support I want see how the patient does with rest and immobilization of the plantar fascial ligament to allow the inflammation to settle down she is going to continue her Celebrex I advised the patient at this3 weeks from now if she is not having any relief she still having the same amount of discomfort or she is not showing signs of improvement I would recommend surgical intervention however 3 weeks from now if she is she is seeing some positive gains in settling down the inflammation she seeing signs of improvement we may want to consider a steroid injection to further facilitate healing at the site of plantar fascial tear I have advised the patient if she is not doing any better in 3 weeks at that point I would recommend the injection.  Patient patient's  were understanding and agreement with this follow-up 3-1/2 weeks they understand the treatment plan the course of treatment and what would be involved surgically.  Face-to-face time equaled 25 min.  This note was created using [employer] voice recognition software that occasionally misinterpreted phrases or words.

## 2020-09-24 ENCOUNTER — OFFICE VISIT (OUTPATIENT)
Dept: PODIATRY | Facility: CLINIC | Age: 40
End: 2020-09-24
Payer: OTHER GOVERNMENT

## 2020-09-24 VITALS
BODY MASS INDEX: 21.69 KG/M2 | DIASTOLIC BLOOD PRESSURE: 72 MMHG | SYSTOLIC BLOOD PRESSURE: 105 MMHG | HEIGHT: 66 IN | WEIGHT: 135 LBS | HEART RATE: 72 BPM | TEMPERATURE: 98 F

## 2020-09-24 DIAGNOSIS — Z01.818 PRE-OP EXAM: ICD-10-CM

## 2020-09-24 DIAGNOSIS — M79.672 HEEL PAIN, BILATERAL: ICD-10-CM

## 2020-09-24 DIAGNOSIS — M62.171 NONTRAUMATIC RUPTURE OF PLANTAR FASCIA OF RIGHT FOOT: Primary | ICD-10-CM

## 2020-09-24 DIAGNOSIS — M77.31 CALCANEAL SPUR OF RIGHT FOOT: ICD-10-CM

## 2020-09-24 DIAGNOSIS — M79.671 HEEL PAIN, BILATERAL: ICD-10-CM

## 2020-09-24 DIAGNOSIS — Z03.818 ENCOUNTER FOR OBSERVATION FOR SUSPECTED EXPOSURE TO OTHER BIOLOGICAL AGENTS RULED OUT: ICD-10-CM

## 2020-09-24 DIAGNOSIS — M72.2 PLANTAR FASCIITIS: ICD-10-CM

## 2020-09-24 PROCEDURE — 99215 OFFICE O/P EST HI 40 MIN: CPT | Mod: 57,S$PBB,, | Performed by: PODIATRIST

## 2020-09-24 PROCEDURE — 99999 PR PBB SHADOW E&M-EST. PATIENT-LVL V: CPT | Mod: PBBFAC,,, | Performed by: PODIATRIST

## 2020-09-24 PROCEDURE — 99215 PR OFFICE/OUTPT VISIT, EST, LEVL V, 40-54 MIN: ICD-10-PCS | Mod: 57,S$PBB,, | Performed by: PODIATRIST

## 2020-09-24 PROCEDURE — 99215 OFFICE O/P EST HI 40 MIN: CPT | Mod: PBBFAC,PN | Performed by: PODIATRIST

## 2020-09-24 PROCEDURE — 99999 PR PBB SHADOW E&M-EST. PATIENT-LVL V: ICD-10-PCS | Mod: PBBFAC,,, | Performed by: PODIATRIST

## 2020-09-24 RX ORDER — ONDANSETRON HYDROCHLORIDE 8 MG/1
8 TABLET, FILM COATED ORAL EVERY 8 HOURS PRN
Qty: 42 TABLET | Refills: 0 | Status: SHIPPED | OUTPATIENT
Start: 2020-09-24 | End: 2020-09-24 | Stop reason: ALTCHOICE

## 2020-09-24 RX ORDER — SUMATRIPTAN 50 MG/1
TABLET, FILM COATED ORAL
COMMUNITY
Start: 2020-09-03

## 2020-09-24 RX ORDER — SULFAMETHOXAZOLE AND TRIMETHOPRIM 400; 80 MG/1; MG/1
2 TABLET ORAL 2 TIMES DAILY
Qty: 56 TABLET | Refills: 0 | Status: SHIPPED | OUTPATIENT
Start: 2020-09-24 | End: 2020-10-08

## 2020-09-24 RX ORDER — CELECOXIB 200 MG/1
200 CAPSULE ORAL 2 TIMES DAILY
COMMUNITY
Start: 2020-09-14

## 2020-09-24 RX ORDER — OXYCODONE AND ACETAMINOPHEN 5; 325 MG/1; MG/1
2 TABLET ORAL
Qty: 60 TABLET | Refills: 0 | Status: SHIPPED | OUTPATIENT
Start: 2020-09-24 | End: 2020-09-29

## 2020-09-24 RX ORDER — PROMETHAZINE HYDROCHLORIDE 12.5 MG/1
12.5 TABLET ORAL 3 TIMES DAILY
Qty: 30 TABLET | Refills: 1 | Status: SHIPPED | OUTPATIENT
Start: 2020-09-24 | End: 2020-10-04

## 2020-09-24 NOTE — LETTER
September 24, 2020      [address] [place] [address]           [facility] - Podiatry/Wound Care  [facility] [facility] [place] [address]  Phone: [phone]  Fax: [phone]          Patient: Brissa Araujo   MR Number: [id number]   YOB: 1980   Date of Visit: 9/24/2020       Dear [facility]:    Thank you for referring Brissa Araujo to me for evaluation. Attached you will find relevant portions of my assessment and plan of care.    If you have questions, please do not hesitate to call me. I look forward to following Brissa Araujo along with you.    Sincerely,    Jean Jimenez, NIRMAL    Enclosure  CC:  No Recipients    If you would like to receive this communication electronically, please contact [email or web address] or [phone] to request more information on [employer] Link access.    For providers and/or their staff who would like to refer a patient to [facility], please contact us through our one-stop-shop provider referral line, [facility], at [phone].    If you feel you have received this communication in error or would no longer like to receive these types of communications, please e-mail [email or web address]

## 2020-09-24 NOTE — H&P (VIEW-ONLY)
Subjective:       Patient ID: Brissa Araujo is a 40 y.o. female.    Chief Complaint: Follow-up, Foot Pain, and Foot Problem   Patient presents today for follow-up she is complaining of bilateral foot pain right greater than left she states that this has been bothering her since 2015 however the patient states it got better with some simple conservative treatment now that it flared up again in March it has not improved.  Patient states she has not improved significantly.  History reviewed. No pertinent past medical history.  Past Surgical History:   Procedure Laterality Date    BREAST SURGERY       Family History   Problem Relation Age of Onset    Diabetes Father      Social History     Socioeconomic History    Marital status:      Spouse name: Not on file    Number of children: Not on file    Years of education: Not on file    Highest education level: Not on file   Occupational History    Not on file   Social Needs    Financial resource strain: Not on file    Food insecurity     Worry: Not on file     Inability: Not on file    Transportation needs     Medical: Not on file     Non-medical: Not on file   Tobacco Use    Smoking status: Never Smoker    Smokeless tobacco: Never Used   Substance and Sexual Activity    Alcohol use: Yes     Comment: occaisonally    Drug use: Never    Sexual activity: Not Currently   Lifestyle    Physical activity     Days per week: Not on file     Minutes per session: Not on file    Stress: Not on file   Relationships    Social connections     Talks on phone: Not on file     Gets together: Not on file     Attends Latter day service: Not on file     Active member of club or organization: Not on file     Attends meetings of clubs or organizations: Not on file     Relationship status: Not on file   Other Topics Concern    Not on file   Social History Narrative    Not on file       Current Outpatient Medications   Medication Sig Dispense Refill    amitriptyline  "(ELAVIL) 25 MG tablet       celecoxib (CELEBREX) 200 MG capsule Take 200 mg by mouth 2 (two) times daily.      oxyCODONE-acetaminophen (PERCOCET) 5-325 mg per tablet Take 2 tablets by mouth 6 (six) times daily. for 5 days 60 tablet 0    promethazine (PHENERGAN) 12.5 MG Tab Take 1 tablet (12.5 mg total) by mouth 3 (three) times daily. for 10 days 30 tablet 1    sulfamethoxazole-trimethoprim 400-80mg (BACTRIM,SEPTRA) 400-80 mg per tablet Take 2 tablets by mouth 2 (two) times daily. for 14 days 56 tablet 0    sumatriptan (IMITREX) 50 MG tablet        No current facility-administered medications for this visit.      Review of patient's allergies indicates:  No Known Allergies    Review of Systems   Musculoskeletal: Positive for arthralgias and gait problem.   All other systems reviewed and are negative.      Objective:      Vitals:    09/24/20 1001   BP: 105/72   Pulse: 72   Temp: 97.8 °F (36.6 °C)   Weight: 61.2 kg (135 lb)   Height: 5' 6" (1.676 m)     Physical Exam  Vitals signs and nursing note reviewed.   Constitutional:       Appearance: Normal appearance.   HENT:      Head: Normocephalic.   Cardiovascular:      Rate and Rhythm: Normal rate and regular rhythm.      Pulses:           Dorsalis pedis pulses are 2+ on the right side and 2+ on the left side.        Posterior tibial pulses are 2+ on the right side and 2+ on the left side.      Heart sounds: Normal heart sounds.   Pulmonary:      Effort: Pulmonary effort is normal.      Breath sounds: Normal breath sounds.   Musculoskeletal: Normal range of motion.         General: Tenderness and signs of injury present.        Feet:    Feet:      Right foot:      Protective Sensation: 2 sites tested. 2 sites sensed.      Skin integrity: Erythema and warmth present.      Left foot:      Protective Sensation: 2 sites tested. 2 sites sensed.   Skin:     General: Skin is warm.   Neurological:      General: No focal deficit present.      Mental Status: She is alert. "   Psychiatric:         Attention and Perception: Attention normal.         Mood and Affect: Mood normal.         Speech: Speech normal.         Behavior: Behavior normal.         Thought Content: Thought content normal.         Cognition and Memory: Cognition normal.         Judgment: Judgment normal.              Patient is noted to have marked inflammation and tenderness at the plantar fascial insertion and skin extending into the arch area along the course of the plantar fascial ligament right.      COVID-19 Routine Screening  Order: [id number]  Status:  Final result   Visible to patient:  Yes (Patient Portal) Next appt:  10/01/2020 at 01:30 PM in Podiatry (Jean Jimenez DPM) Dx:  Encounter for observation for suspect...   Ref Range & Units 1d ago   SARS-CoV2 (COVID-19) Qualitative PCR Not Detected Not Detected            Contains abnormal data CBC auto differential  Order: [id number]  Status:  Final result   Visible to patient:  Yes (Patient Portal) Next appt:  10/01/2020 at 01:30 PM in Podiatry (Jean Jimenez DPM) Dx:  Plantar fasciitis; Pre-op exam; Heel ...   Ref Range & Units 2d ago   WBC 3.90 - 12.70 K/uL 6.68    RBC 4.00 - 5.40 M/uL 4.30    Hemoglobin 12.0 - 16.0 g/dL 13.5    Hematocrit 37.0 - 48.5 % 40.5    Mean Corpuscular Volume 82 - 98 fL 94    Mean Corpuscular Hemoglobin 27.0 - 31.0 pg 31.4High     Mean Corpuscular Hemoglobin Conc 32.0 - 36.0 g/dL 33.3    RDW 11.5 - 14.5 % 12.2    Platelets 150 - 350 K/uL 237    MPV 9.2 - 12.9 fL 10.1    Immature Granulocytes 0.0 - 0.5 % 0.3    Gran # (ANC) 1.8 - 7.7 K/uL 4.0    Immature Grans (Abs) 0.00 - 0.04 K/uL 0.02    Comment: Mild elevation in immature granulocytes is non specific and   can be seen in a variety of conditions including stress response,   acute inflammation, trauma and pregnancy. Correlation with other   laboratory and clinical findings is essential.    Lymph # 1.0 - 4.8 K/uL 2.1    Mono # 0.3 - 1.0 K/uL 0.5    Eos # 0.0 - 0.5 K/uL 0.1     Baso # 0.00 - 0.20 K/uL 0.03    nRBC 0 /100 WBC 0    Gran% 38.0 - 73.0 % 60.1    Lymph% 18.0 - 48.0 % 30.7    Mono% 4.0 - 15.0 % 7.6    Eosinophil% 0.0 - 8.0 % 0.9    Basophil% 0.0 - 1.9 % 0.4    Differential Method  Automated    [facility]         Specimen Collected: 09/25/20 11:17 Last Resulted: 09/25/20 11:20             Comprehensive metabolic panel  Order: [id number]  Status:  Final result   Visible to patient:  Yes (Patient Portal) Next appt:  10/01/2020 at 01:30 PM in Podiatry (Jean Jimenez DPM) Dx:  Plantar fasciitis; Pre-op exam; Heel ...   Ref Range & Units 2d ago   Sodium 136 - 145 mmol/L 136    Potassium 3.5 - 5.1 mmol/L 4.1    Chloride 95 - 110 mmol/L 99    CO2 23 - 29 mmol/L 26    Glucose 70 - 110 mg/dL 88    BUN, Bld 6 - 20 mg/dL 16    Creatinine 0.5 - 1.4 mg/dL 0.6    Calcium 8.7 - 10.5 mg/dL 9.1    Total Protein 6.0 - 8.4 g/dL 7.6    Albumin 3.5 - 5.2 g/dL 4.5    Total Bilirubin 0.1 - 1.0 mg/dL 0.7    Comment: For infants and newborns, interpretation of results should be based   on gestational age, weight and in agreement with clinical   observations.   Premature Infant recommended reference ranges:   Up to 24 hours.............<8.0 mg/dL   Up to 48 hours............<12.0 mg/dL   3-5 days..................<15.0 mg/dL   6-29 days.................<15.0 mg/dL    Alkaline Phosphatase 55 - 135 U/L 101    AST 10 - 40 U/L 17    ALT 10 - 44 U/L 15    Anion Gap 8 - 16 mmol/L 11    eGFR if African American >60 mL/min/1.73 m^2 >60.0    eGFR if non African American >60 mL/min/1.73 m^2 >60.0    Comment: Calculation used to obtain the estimated glomerular filtration   rate (eGFR) is the CKD-EPI equation.    Resulting Agency  [facility]         Specimen Collected: 09/25/20 11:17 Last Resulted: 09/25/20 11:54                   Assessment:       1. Nontraumatic rupture of plantar fascia of right foot    2. Plantar fasciitis    3. Heel pain, bilateral    4. Pre-op exam    5. Encounter for  observation for suspected exposure to other biological agents ruled out    6. Calcaneal spur of right foot        Plan:       Patient presents today for F/U regarding the patient's right foot and severe pain on the plantar aspect of the right foot and at the plantar fascial insertion right.  Patient states that she is now starting to have pain on the left foot primarily on the outside of the left foot and along the course of the peroneal tendons this is likely related to the patient compensating for her right foot pain it is something that we need to monitor and the patient needs to make sure she is wearing the proper arch supports on the left foot also.  Patient is taking the Celebrex 200 mg twice a day as directed she still having a lot of discomfort lot of pain she has not been able to run or exercise the way she would like to.   Patient relates she wore the fracture boot with her arch support at all times of weight-bearing for 3 weeks she has tried to transition out of the fracture boot back into normal tennis shoes with her arch support and states she is having no improvement no relief from her discomfort she states even when she was wearing the fracture boot it only relieve the discomfort in her right foot by 10%.  Previous MRI relates significant severe plantar fasciitis with tearing of the plantar fascial ligament right.  MRI indicates severe plantar fasciitis of the central and medial bands with a 7 mm partial thickness interstitial tearing .  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  Chronic sprain of the ATFL is appreciated.  This severe as to area of discomfort is along the course of the plantar fascia and at the site of plantar fascial tearing right patient has not gotten significant relief with conservative measures and states she wishes to pursue surgery as previously discussed.   Decision for surgery was made today. Patient presents today for preoperative history and physical in discussion all aspects of surgery were discussed with the patient no guarantees were given written or implied all potential complications including but not limited to delayed healing nonhealing postoperative pain infection recurrence were discussed with the patient in detail. All aspect of the patient's recovery time involved in recovery patient responsibility is involving recovery were also discussed in detail. Patient advised failure to comply with postoperative care will jeopardize surgical outcome.  All aspects of surgical intervention was discussed at length and in detail with the patient today the patient's  was present on a phone call on speaker phone to discuss surgical intervention patient understands she will be be completely nonweightbearing for at least 3 weeks in a fiberglass cast followed by several weeks of nonweightbearing in a fracture boot then she will be able to transition to weight-bearing after a couple weeks in the boot and then ultimately transition to normal shoes weight-bearing.  I did advised the patient she can expect to have postoperative pain and discomfort and inflammation for up to 6 months following this procedure she will likely not be able to restart jogging for 6 months typically she can restart her walking regiment and working out 2-3 months after surgery.  Patient was in understanding and agreement with everything discussed today as was her  in agreement patient signed a consent form agreeing to surgical intervention including a plantar fasciotomy and heel spur resection as needed right.  Patient was advised that typically where the plantar fascial ligament attached to the calcaneus there will be a small ridge or bone spurring present this is typically smoothed out with a hand rasp to ensure contouring of the bone at the previously noted plantar fascial insertion.   Preoperative lab work including a COVID-19 test have been ordered and will be evaluated prior to surgery patient was dispensed prescription for a knee walker to help maintain her nonweightbearing status Percocet for postoperative pain management Bactrim for postoperative prophylaxis against infection and Phenergan for postoperative nausea.  Patient has been advised to contact us with any problems questions or concerns prior to her surgery which is scheduled for October 2, 2020.  Patient may need custom-molded orthotics postoperatively however at this time were going to have the patient continue to utilize the over-the-counter arch supports that have made modifications to plan to have her use these postoperatively and will only recommend custom of molded orthotics if absolutely necessary however this is a consideration with the patient's history of intense exercise physical activity and jogging.  Total face-to-face time including discussion evaluation decision for surgery preoperative evaluation and preoperative history and physical as well as surgical consultation equaled 60 min.This note was created using [employer] voice recognition software that occasionally misinterpreted phrases or words.

## 2020-09-24 NOTE — PROGRESS NOTES
Subjective:       Patient ID: Brissa Araujo is a 40 y.o. female.    Chief Complaint: Follow-up, Foot Pain, and Foot Problem   Patient presents today for follow-up she is complaining of bilateral foot pain right greater than left she states that this has been bothering her since 2015 however the patient states it got better with some simple conservative treatment now that it flared up again in March it has not improved.  Patient states she has not improved significantly.  History reviewed. No pertinent past medical history.  Past Surgical History:   Procedure Laterality Date    BREAST SURGERY       Family History   Problem Relation Age of Onset    Diabetes Father      Social History     Socioeconomic History    Marital status:      Spouse name: Not on file    Number of children: Not on file    Years of education: Not on file    Highest education level: Not on file   Occupational History    Not on file   Social Needs    Financial resource strain: Not on file    Food insecurity     Worry: Not on file     Inability: Not on file    Transportation needs     Medical: Not on file     Non-medical: Not on file   Tobacco Use    Smoking status: Never Smoker    Smokeless tobacco: Never Used   Substance and Sexual Activity    Alcohol use: Yes     Comment: occaisonally    Drug use: Never    Sexual activity: Not Currently   Lifestyle    Physical activity     Days per week: Not on file     Minutes per session: Not on file    Stress: Not on file   Relationships    Social connections     Talks on phone: Not on file     Gets together: Not on file     Attends Mu-ism service: Not on file     Active member of club or organization: Not on file     Attends meetings of clubs or organizations: Not on file     Relationship status: Not on file   Other Topics Concern    Not on file   Social History Narrative    Not on file       Current Outpatient Medications   Medication Sig Dispense Refill    amitriptyline  "(ELAVIL) 25 MG tablet       celecoxib (CELEBREX) 200 MG capsule Take 200 mg by mouth 2 (two) times daily.      oxyCODONE-acetaminophen (PERCOCET) 5-325 mg per tablet Take 2 tablets by mouth 6 (six) times daily. for 5 days 60 tablet 0    promethazine (PHENERGAN) 12.5 MG Tab Take 1 tablet (12.5 mg total) by mouth 3 (three) times daily. for 10 days 30 tablet 1    sulfamethoxazole-trimethoprim 400-80mg (BACTRIM,SEPTRA) 400-80 mg per tablet Take 2 tablets by mouth 2 (two) times daily. for 14 days 56 tablet 0    sumatriptan (IMITREX) 50 MG tablet        No current facility-administered medications for this visit.      Review of patient's allergies indicates:  No Known Allergies    Review of Systems   Musculoskeletal: Positive for arthralgias and gait problem.   All other systems reviewed and are negative.      Objective:      Vitals:    09/24/20 1001   BP: 105/72   Pulse: 72   Temp: 97.8 °F (36.6 °C)   Weight: 61.2 kg (135 lb)   Height: 5' 6" (1.676 m)     Physical Exam  Vitals signs and nursing note reviewed.   Constitutional:       Appearance: Normal appearance.   HENT:      Head: Normocephalic.   Cardiovascular:      Rate and Rhythm: Normal rate and regular rhythm.      Pulses:           Dorsalis pedis pulses are 2+ on the right side and 2+ on the left side.        Posterior tibial pulses are 2+ on the right side and 2+ on the left side.      Heart sounds: Normal heart sounds.   Pulmonary:      Effort: Pulmonary effort is normal.      Breath sounds: Normal breath sounds.   Musculoskeletal: Normal range of motion.         General: Tenderness and signs of injury present.        Feet:    Feet:      Right foot:      Protective Sensation: 2 sites tested. 2 sites sensed.      Skin integrity: Erythema and warmth present.      Left foot:      Protective Sensation: 2 sites tested. 2 sites sensed.   Skin:     General: Skin is warm.   Neurological:      General: No focal deficit present.      Mental Status: She is alert. "   Psychiatric:         Attention and Perception: Attention normal.         Mood and Affect: Mood normal.         Speech: Speech normal.         Behavior: Behavior normal.         Thought Content: Thought content normal.         Cognition and Memory: Cognition normal.         Judgment: Judgment normal.              Patient is noted to have marked inflammation and tenderness at the plantar fascial insertion and skin extending into the arch area along the course of the plantar fascial ligament right.      COVID-19 Routine Screening  Order: [id number]  Status:  Final result   Visible to patient:  Yes (Patient Portal) Next appt:  10/01/2020 at 01:30 PM in Podiatry (Jean Jimenez DPM) Dx:  Encounter for observation for suspect...   Ref Range & Units 1d ago   SARS-CoV2 (COVID-19) Qualitative PCR Not Detected Not Detected            Contains abnormal data CBC auto differential  Order: [id number]  Status:  Final result   Visible to patient:  Yes (Patient Portal) Next appt:  10/01/2020 at 01:30 PM in Podiatry (Jean Jimenez DPM) Dx:  Plantar fasciitis; Pre-op exam; Heel ...   Ref Range & Units 2d ago   WBC 3.90 - 12.70 K/uL 6.68    RBC 4.00 - 5.40 M/uL 4.30    Hemoglobin 12.0 - 16.0 g/dL 13.5    Hematocrit 37.0 - 48.5 % 40.5    Mean Corpuscular Volume 82 - 98 fL 94    Mean Corpuscular Hemoglobin 27.0 - 31.0 pg 31.4High     Mean Corpuscular Hemoglobin Conc 32.0 - 36.0 g/dL 33.3    RDW 11.5 - 14.5 % 12.2    Platelets 150 - 350 K/uL 237    MPV 9.2 - 12.9 fL 10.1    Immature Granulocytes 0.0 - 0.5 % 0.3    Gran # (ANC) 1.8 - 7.7 K/uL 4.0    Immature Grans (Abs) 0.00 - 0.04 K/uL 0.02    Comment: Mild elevation in immature granulocytes is non specific and   can be seen in a variety of conditions including stress response,   acute inflammation, trauma and pregnancy. Correlation with other   laboratory and clinical findings is essential.    Lymph # 1.0 - 4.8 K/uL 2.1    Mono # 0.3 - 1.0 K/uL 0.5    Eos # 0.0 - 0.5 K/uL 0.1     Baso # 0.00 - 0.20 K/uL 0.03    nRBC 0 /100 WBC 0    Gran% 38.0 - 73.0 % 60.1    Lymph% 18.0 - 48.0 % 30.7    Mono% 4.0 - 15.0 % 7.6    Eosinophil% 0.0 - 8.0 % 0.9    Basophil% 0.0 - 1.9 % 0.4    Differential Method  Automated    [facility]         Specimen Collected: 09/25/20 11:17 Last Resulted: 09/25/20 11:20             Comprehensive metabolic panel  Order: [id number]  Status:  Final result   Visible to patient:  Yes (Patient Portal) Next appt:  10/01/2020 at 01:30 PM in Podiatry (Jean Jimenez DPM) Dx:  Plantar fasciitis; Pre-op exam; Heel ...   Ref Range & Units 2d ago   Sodium 136 - 145 mmol/L 136    Potassium 3.5 - 5.1 mmol/L 4.1    Chloride 95 - 110 mmol/L 99    CO2 23 - 29 mmol/L 26    Glucose 70 - 110 mg/dL 88    BUN, Bld 6 - 20 mg/dL 16    Creatinine 0.5 - 1.4 mg/dL 0.6    Calcium 8.7 - 10.5 mg/dL 9.1    Total Protein 6.0 - 8.4 g/dL 7.6    Albumin 3.5 - 5.2 g/dL 4.5    Total Bilirubin 0.1 - 1.0 mg/dL 0.7    Comment: For infants and newborns, interpretation of results should be based   on gestational age, weight and in agreement with clinical   observations.   Premature Infant recommended reference ranges:   Up to 24 hours.............<8.0 mg/dL   Up to 48 hours............<12.0 mg/dL   3-5 days..................<15.0 mg/dL   6-29 days.................<15.0 mg/dL    Alkaline Phosphatase 55 - 135 U/L 101    AST 10 - 40 U/L 17    ALT 10 - 44 U/L 15    Anion Gap 8 - 16 mmol/L 11    eGFR if African American >60 mL/min/1.73 m^2 >60.0    eGFR if non African American >60 mL/min/1.73 m^2 >60.0    Comment: Calculation used to obtain the estimated glomerular filtration   rate (eGFR) is the CKD-EPI equation.    Resulting Agency  [facility]         Specimen Collected: 09/25/20 11:17 Last Resulted: 09/25/20 11:54                   Assessment:       1. Nontraumatic rupture of plantar fascia of right foot    2. Plantar fasciitis    3. Heel pain, bilateral    4. Pre-op exam    5. Encounter for  observation for suspected exposure to other biological agents ruled out    6. Calcaneal spur of right foot        Plan:       Patient presents today for F/U regarding the patient's right foot and severe pain on the plantar aspect of the right foot and at the plantar fascial insertion right.  Patient states that she is now starting to have pain on the left foot primarily on the outside of the left foot and along the course of the peroneal tendons this is likely related to the patient compensating for her right foot pain it is something that we need to monitor and the patient needs to make sure she is wearing the proper arch supports on the left foot also.  Patient is taking the Celebrex 200 mg twice a day as directed she still having a lot of discomfort lot of pain she has not been able to run or exercise the way she would like to.   Patient relates she wore the fracture boot with her arch support at all times of weight-bearing for 3 weeks she has tried to transition out of the fracture boot back into normal tennis shoes with her arch support and states she is having no improvement no relief from her discomfort she states even when she was wearing the fracture boot it only relieve the discomfort in her right foot by 10%.  Previous MRI relates significant severe plantar fasciitis with tearing of the plantar fascial ligament right.  MRI indicates severe plantar fasciitis of the central and medial bands with a 7 mm partial thickness interstitial tearing .  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  Chronic sprain of the ATFL is appreciated.  This severe as to area of discomfort is along the course of the plantar fascia and at the site of plantar fascial tearing right patient has not gotten significant relief with conservative measures and states she wishes to pursue surgery as previously discussed.   Decision for surgery was made today. Patient presents today for preoperative history and physical in discussion all aspects of surgery were discussed with the patient no guarantees were given written or implied all potential complications including but not limited to delayed healing nonhealing postoperative pain infection recurrence were discussed with the patient in detail. All aspect of the patient's recovery time involved in recovery patient responsibility is involving recovery were also discussed in detail. Patient advised failure to comply with postoperative care will jeopardize surgical outcome.  All aspects of surgical intervention was discussed at length and in detail with the patient today the patient's  was present on a phone call on speaker phone to discuss surgical intervention patient understands she will be be completely nonweightbearing for at least 3 weeks in a fiberglass cast followed by several weeks of nonweightbearing in a fracture boot then she will be able to transition to weight-bearing after a couple weeks in the boot and then ultimately transition to normal shoes weight-bearing.  I did advised the patient she can expect to have postoperative pain and discomfort and inflammation for up to 6 months following this procedure she will likely not be able to restart jogging for 6 months typically she can restart her walking regiment and working out 2-3 months after surgery.  Patient was in understanding and agreement with everything discussed today as was her  in agreement patient signed a consent form agreeing to surgical intervention including a plantar fasciotomy and heel spur resection as needed right.  Patient was advised that typically where the plantar fascial ligament attached to the calcaneus there will be a small ridge or bone spurring present this is typically smoothed out with a hand rasp to ensure contouring of the bone at the previously noted plantar fascial insertion.   Preoperative lab work including a COVID-19 test have been ordered and will be evaluated prior to surgery patient was dispensed prescription for a knee walker to help maintain her nonweightbearing status Percocet for postoperative pain management Bactrim for postoperative prophylaxis against infection and Phenergan for postoperative nausea.  Patient has been advised to contact us with any problems questions or concerns prior to her surgery which is scheduled for October 2, 2020.  Patient may need custom-molded orthotics postoperatively however at this time were going to have the patient continue to utilize the over-the-counter arch supports that have made modifications to plan to have her use these postoperatively and will only recommend custom of molded orthotics if absolutely necessary however this is a consideration with the patient's history of intense exercise physical activity and jogging.  Total face-to-face time including discussion evaluation decision for surgery preoperative evaluation and preoperative history and physical as well as surgical consultation equaled 60 min.This note was created using [employer] voice recognition software that occasionally misinterpreted phrases or words.

## 2020-09-25 ENCOUNTER — HOSPITAL ENCOUNTER (OUTPATIENT)
Dept: PREADMISSION TESTING | Facility: HOSPITAL | Age: 40
Discharge: HOME | End: 2020-09-25
Attending: PODIATRIST
Payer: OTHER GOVERNMENT

## 2020-09-25 VITALS — HEIGHT: 66 IN | BODY MASS INDEX: 21.69 KG/M2 | WEIGHT: 135 LBS

## 2020-09-25 DIAGNOSIS — Z01.818 PREOP EXAMINATION: ICD-10-CM

## 2020-09-26 ENCOUNTER — LAB VISIT (OUTPATIENT)
Dept: FAMILY MEDICINE | Facility: CLINIC | Age: 40
End: 2020-09-26
Payer: OTHER GOVERNMENT

## 2020-09-26 DIAGNOSIS — Z03.818 ENCOUNTER FOR OBSERVATION FOR SUSPECTED EXPOSURE TO OTHER BIOLOGICAL AGENTS RULED OUT: ICD-10-CM

## 2020-09-26 PROCEDURE — U0003 INFECTIOUS AGENT DETECTION BY NUCLEIC ACID (DNA OR RNA); SEVERE ACUTE RESPIRATORY SYNDROME CORONAVIRUS 2 (SARS-COV-2) (CORONAVIRUS DISEASE [COVID-19]), AMPLIFIED PROBE TECHNIQUE, MAKING USE OF HIGH THROUGHPUT TECHNOLOGIES AS DESCRIBED BY CMS-2020-01-R: HCPCS

## 2020-09-27 LAB — SARS-COV-2 RNA RESP QL NAA+PROBE: NOT DETECTED

## 2020-09-27 RX ORDER — SODIUM CHLORIDE, SODIUM LACTATE, POTASSIUM CHLORIDE, CALCIUM CHLORIDE 600; 310; 30; 20 MG/100ML; MG/100ML; MG/100ML; MG/100ML
INJECTION, SOLUTION INTRAVENOUS CONTINUOUS
Status: CANCELLED | OUTPATIENT
Start: 2020-10-02

## 2020-09-28 ENCOUNTER — ANESTHESIA EVENT (OUTPATIENT)
Dept: SURGERY | Facility: HOSPITAL | Age: 40
End: 2020-09-28
Payer: OTHER GOVERNMENT

## 2020-09-29 ENCOUNTER — HOSPITAL ENCOUNTER (OUTPATIENT)
Facility: HOSPITAL | Age: 40
Discharge: HOME | End: 2020-09-29
Attending: PODIATRIST | Admitting: PODIATRIST
Payer: OTHER GOVERNMENT

## 2020-09-29 ENCOUNTER — ANESTHESIA (OUTPATIENT)
Dept: SURGERY | Facility: HOSPITAL | Age: 40
End: 2020-09-29
Payer: OTHER GOVERNMENT

## 2020-09-29 VITALS
HEART RATE: 74 BPM | OXYGEN SATURATION: 97 % | RESPIRATION RATE: 16 BRPM | SYSTOLIC BLOOD PRESSURE: 110 MMHG | DIASTOLIC BLOOD PRESSURE: 76 MMHG | TEMPERATURE: 99 F

## 2020-09-29 DIAGNOSIS — M62.171 NONTRAUMATIC RUPTURE OF PLANTAR FASCIA OF RIGHT FOOT: ICD-10-CM

## 2020-09-29 DIAGNOSIS — M72.2 PLANTAR FASCIITIS: ICD-10-CM

## 2020-09-29 DIAGNOSIS — M79.671 HEEL PAIN, BILATERAL: ICD-10-CM

## 2020-09-29 DIAGNOSIS — M79.672 HEEL PAIN, BILATERAL: ICD-10-CM

## 2020-09-29 DIAGNOSIS — M77.31 CALCANEAL SPUR OF RIGHT FOOT: ICD-10-CM

## 2020-09-29 LAB — B-HCG UR QL: NEGATIVE

## 2020-09-29 PROCEDURE — 37000009 HC ANESTHESIA EA ADD 15 MINS: Performed by: PODIATRIST

## 2020-09-29 PROCEDURE — 63600175 PHARM REV CODE 636 W HCPCS: Performed by: PODIATRIST

## 2020-09-29 PROCEDURE — 28119 PR REMOVAL OF HEEL SPUR: ICD-10-PCS | Mod: RT,,, | Performed by: PODIATRIST

## 2020-09-29 PROCEDURE — 37000008 HC ANESTHESIA 1ST 15 MINUTES: Performed by: PODIATRIST

## 2020-09-29 PROCEDURE — 25000003 PHARM REV CODE 250: Performed by: PODIATRIST

## 2020-09-29 PROCEDURE — 71000033 HC RECOVERY, INTIAL HOUR: Performed by: PODIATRIST

## 2020-09-29 PROCEDURE — 36000707: Performed by: PODIATRIST

## 2020-09-29 PROCEDURE — 36000706: Performed by: PODIATRIST

## 2020-09-29 PROCEDURE — 81025 URINE PREGNANCY TEST: CPT

## 2020-09-29 PROCEDURE — 28119 REMOVAL OF HEEL SPUR: CPT | Mod: RT,,, | Performed by: PODIATRIST

## 2020-09-29 PROCEDURE — 71000015 HC POSTOP RECOV 1ST HR: Performed by: PODIATRIST

## 2020-09-29 PROCEDURE — 63600175 PHARM REV CODE 636 W HCPCS: Performed by: NURSE ANESTHETIST, CERTIFIED REGISTERED

## 2020-09-29 PROCEDURE — D9220A PRA ANESTHESIA: Mod: ANES,,, | Performed by: ANESTHESIOLOGY

## 2020-09-29 PROCEDURE — D9220A PRA ANESTHESIA: ICD-10-PCS | Mod: ANES,,, | Performed by: ANESTHESIOLOGY

## 2020-09-29 PROCEDURE — 25000003 PHARM REV CODE 250: Performed by: NURSE ANESTHETIST, CERTIFIED REGISTERED

## 2020-09-29 PROCEDURE — D9220A PRA ANESTHESIA: ICD-10-PCS | Mod: CRNA,,, | Performed by: NURSE ANESTHETIST, CERTIFIED REGISTERED

## 2020-09-29 PROCEDURE — D9220A PRA ANESTHESIA: Mod: CRNA,,, | Performed by: NURSE ANESTHETIST, CERTIFIED REGISTERED

## 2020-09-29 RX ORDER — MORPHINE SULFATE 4 MG/ML
2 INJECTION, SOLUTION INTRAMUSCULAR; INTRAVENOUS EVERY 5 MIN PRN
Status: DISCONTINUED | OUTPATIENT
Start: 2020-09-29 | End: 2020-09-29 | Stop reason: HOSPADM

## 2020-09-29 RX ORDER — CEFAZOLIN SODIUM 1 G/50ML
SOLUTION INTRAVENOUS
Status: DISCONTINUED
Start: 2020-09-29 | End: 2020-09-29 | Stop reason: HOSPADM

## 2020-09-29 RX ORDER — PROPOFOL 10 MG/ML
VIAL (ML) INTRAVENOUS
Status: DISCONTINUED | OUTPATIENT
Start: 2020-09-29 | End: 2020-09-29

## 2020-09-29 RX ORDER — SODIUM CHLORIDE, SODIUM LACTATE, POTASSIUM CHLORIDE, CALCIUM CHLORIDE 600; 310; 30; 20 MG/100ML; MG/100ML; MG/100ML; MG/100ML
125 INJECTION, SOLUTION INTRAVENOUS CONTINUOUS
Status: DISCONTINUED | OUTPATIENT
Start: 2020-09-29 | End: 2020-09-29 | Stop reason: HOSPADM

## 2020-09-29 RX ORDER — OXYCODONE AND ACETAMINOPHEN 5; 325 MG/1; MG/1
1 TABLET ORAL
Status: DISCONTINUED | OUTPATIENT
Start: 2020-09-29 | End: 2020-09-29 | Stop reason: HOSPADM

## 2020-09-29 RX ORDER — ONDANSETRON 2 MG/ML
INJECTION INTRAMUSCULAR; INTRAVENOUS
Status: DISCONTINUED | OUTPATIENT
Start: 2020-09-29 | End: 2020-09-29

## 2020-09-29 RX ORDER — ONDANSETRON 2 MG/ML
4 INJECTION INTRAMUSCULAR; INTRAVENOUS DAILY PRN
Status: DISCONTINUED | OUTPATIENT
Start: 2020-09-29 | End: 2020-09-29 | Stop reason: HOSPADM

## 2020-09-29 RX ORDER — LIDOCAINE HYDROCHLORIDE 10 MG/ML
INJECTION INFILTRATION; PERINEURAL
Status: DISCONTINUED | OUTPATIENT
Start: 2020-09-29 | End: 2020-09-29 | Stop reason: HOSPADM

## 2020-09-29 RX ORDER — CEFAZOLIN SODIUM 1 G/50ML
1 SOLUTION INTRAVENOUS
Status: COMPLETED | OUTPATIENT
Start: 2020-10-02 | End: 2020-09-29

## 2020-09-29 RX ORDER — DEXAMETHASONE SODIUM PHOSPHATE 4 MG/ML
INJECTION, SOLUTION INTRA-ARTICULAR; INTRALESIONAL; INTRAMUSCULAR; INTRAVENOUS; SOFT TISSUE
Status: DISCONTINUED | OUTPATIENT
Start: 2020-09-29 | End: 2020-09-29

## 2020-09-29 RX ORDER — LIDOCAINE HYDROCHLORIDE 10 MG/ML
1 INJECTION, SOLUTION EPIDURAL; INFILTRATION; INTRACAUDAL; PERINEURAL ONCE
Status: DISCONTINUED | OUTPATIENT
Start: 2020-09-29 | End: 2020-09-29 | Stop reason: HOSPADM

## 2020-09-29 RX ORDER — MEPERIDINE HYDROCHLORIDE 50 MG/ML
INJECTION INTRAMUSCULAR; INTRAVENOUS; SUBCUTANEOUS
Status: DISCONTINUED | OUTPATIENT
Start: 2020-09-29 | End: 2020-09-29

## 2020-09-29 RX ORDER — SODIUM CHLORIDE, SODIUM LACTATE, POTASSIUM CHLORIDE, CALCIUM CHLORIDE 600; 310; 30; 20 MG/100ML; MG/100ML; MG/100ML; MG/100ML
INJECTION, SOLUTION INTRAVENOUS CONTINUOUS
Status: DISCONTINUED | OUTPATIENT
Start: 2020-10-02 | End: 2020-09-29 | Stop reason: HOSPADM

## 2020-09-29 RX ORDER — BUPIVACAINE HYDROCHLORIDE 5 MG/ML
INJECTION, SOLUTION PERINEURAL
Status: DISCONTINUED | OUTPATIENT
Start: 2020-09-29 | End: 2020-09-29 | Stop reason: HOSPADM

## 2020-09-29 RX ORDER — LIDOCAINE HYDROCHLORIDE 20 MG/ML
INJECTION, SOLUTION EPIDURAL; INFILTRATION; INTRACAUDAL; PERINEURAL
Status: DISCONTINUED | OUTPATIENT
Start: 2020-09-29 | End: 2020-09-29

## 2020-09-29 RX ORDER — SODIUM CHLORIDE, SODIUM LACTATE, POTASSIUM CHLORIDE, CALCIUM CHLORIDE 600; 310; 30; 20 MG/100ML; MG/100ML; MG/100ML; MG/100ML
INJECTION, SOLUTION INTRAVENOUS CONTINUOUS
Status: DISCONTINUED | OUTPATIENT
Start: 2020-09-29 | End: 2020-09-29 | Stop reason: HOSPADM

## 2020-09-29 RX ORDER — MIDAZOLAM HYDROCHLORIDE 1 MG/ML
INJECTION, SOLUTION INTRAMUSCULAR; INTRAVENOUS
Status: DISCONTINUED | OUTPATIENT
Start: 2020-09-29 | End: 2020-09-29

## 2020-09-29 RX ADMIN — SODIUM CHLORIDE, POTASSIUM CHLORIDE, SODIUM LACTATE AND CALCIUM CHLORIDE: 600; 310; 30; 20 INJECTION, SOLUTION INTRAVENOUS at 06:09

## 2020-09-29 RX ADMIN — DEXAMETHASONE SODIUM PHOSPHATE 8 MG: 4 INJECTION, SOLUTION INTRAMUSCULAR; INTRAVENOUS at 07:09

## 2020-09-29 RX ADMIN — MEPERIDINE HYDROCHLORIDE 25 MG: 50 INJECTION INTRAMUSCULAR; INTRAVENOUS; SUBCUTANEOUS at 07:09

## 2020-09-29 RX ADMIN — SODIUM CHLORIDE, POTASSIUM CHLORIDE, SODIUM LACTATE AND CALCIUM CHLORIDE: 600; 310; 30; 20 INJECTION, SOLUTION INTRAVENOUS at 07:09

## 2020-09-29 RX ADMIN — PROPOFOL 200 MG: 10 INJECTION, EMULSION INTRAVENOUS at 07:09

## 2020-09-29 RX ADMIN — MIDAZOLAM HYDROCHLORIDE 2 MG: 1 INJECTION, SOLUTION INTRAMUSCULAR; INTRAVENOUS at 07:09

## 2020-09-29 RX ADMIN — LIDOCAINE HYDROCHLORIDE 100 MG: 20 INJECTION, SOLUTION EPIDURAL; INFILTRATION; INTRACAUDAL; PERINEURAL at 07:09

## 2020-09-29 RX ADMIN — CEFAZOLIN SODIUM 1 G: 1 SOLUTION INTRAVENOUS at 07:09

## 2020-09-29 RX ADMIN — ONDANSETRON 4 MG: 2 INJECTION INTRAMUSCULAR; INTRAVENOUS at 07:09

## 2020-09-29 NOTE — ANESTHESIA PREPROCEDURE EVALUATION
09/29/2020  Brissa Araujo is a 40 y.o., female.    Anesthesia Evaluation    I have reviewed the Patient Summary Reports.    I have reviewed the Nursing Notes.    I have reviewed the Medications.     Review of Systems  Anesthesia Hx:  No problems with previous Anesthesia  Neg history of prior surgery. Denies Family Hx of Anesthesia complications.   Denies Personal Hx of Anesthesia complications.   Social:  Non-Smoker    Hematology/Oncology:  Hematology Normal   Oncology Normal     EENT/Dental:EENT/Dental Normal   Cardiovascular:  Cardiovascular Normal     Pulmonary:  Pulmonary Normal    Renal/:  Renal/ Normal     Hepatic/GI:  Hepatic/GI Normal    Musculoskeletal:  Musculoskeletal Normal    Neurological:  Neurology Normal    Endocrine:  Endocrine Normal    Dermatological:  Skin Normal    Psych:  Psychiatric Normal           Physical Exam  General:  Well nourished    Airway/Jaw/Neck:  Airway Findings: Mouth Opening: Normal Tongue: Normal  General Airway Assessment: Adult  Mallampati: II  TM Distance: 4 - 6 cm        Eyes/Ears/Nose:  EYES/EARS/NOSE FINDINGS: Normal   Dental:  DENTAL FINDINGS: Normal   Chest/Lungs:  Chest/Lungs Clear    Heart/Vascular:  Heart Findings: Normal Heart murmur: negative Vascular Findings: Normal    Abdomen:  Abdomen Findings: Normal    Musculoskeletal:  Musculoskeletal Findings: Normal   Skin:  Skin Findings: Normal    Mental Status:  Mental Status Findings: Normal        Anesthesia Plan  Type of Anesthesia, risks & benefits discussed:  Anesthesia Type:  general  Patient's Preference:   Intra-op Monitoring Plan: standard ASA monitors  Intra-op Monitoring Plan Comments:   Post Op Pain Control Plan:   Post Op Pain Control Plan Comments:   Induction:   IV  Beta Blocker:  Patient is not currently on a Beta-Blocker (No further documentation required).       Informed Consent: Patient  understands risks and agrees with Anesthesia plan.  Questions answered. Anesthesia consent signed with patient.  ASA Score: 1     Day of Surgery Review of History & Physical: I have interviewed and examined the patient. I have reviewed the patient's H&P dated:    H&P update referred to the provider.         Ready For Surgery From Anesthesia Perspective.

## 2020-09-29 NOTE — INTERVAL H&P NOTE
The patient has been examined and the H&P has been reviewed:    I concur with the findings and no changes have occurred since H&P was written.    Anesthesia/Surgery risks, benefits and alternative options discussed and understood by patient/family.          Active Hospital Problems    Diagnosis  POA    Nontraumatic rupture of plantar fascia of right foot [M62.171]  Yes      Resolved Hospital Problems   No resolved problems to display.

## 2020-09-29 NOTE — BRIEF OP NOTE
[facility] - Periop Services  Brief Operative Note    Surgery Date: 9/29/2020     Surgeon(s) and Role:     * Jean Jimenez DPM - Primary    Assisting Surgeon: None    Pre-op Diagnosis:  Plantar fasciitis [M72.2]  Nontraumatic rupture of plantar fascia of right foot [M62.171]    Post-op Diagnosis:  Post-Op Diagnosis Codes:     * Plantar fasciitis [M72.2]     * Nontraumatic rupture of plantar fascia of right foot [M62.171]    Procedure(s) (LRB):  FASCIOTOMY, PLANTAR and heel spur resection. (Right)    Anesthesia: Choice    Description of the findings of the procedure(s):     Estimated Blood Loss: * No values recorded between 9/29/2020  7:54 AM and 9/29/2020  8:34 AM *         Specimens:   Specimen (12h ago, onward)    None            Discharge Note    OUTCOME: Patient tolerated treatment/procedure well without complication and is now ready for discharge.    DISPOSITION: Home or Self Care    FINAL DIAGNOSIS:  Nontraumatic rupture of plantar fascia of right foot    FOLLOWUP: In clinic    DISCHARGE INSTRUCTIONS:    Discharge Procedure Orders   Keep surgical extremity elevated     Ice to affected area     Lifting restrictions     Weight bearing restrictions (specify):

## 2020-09-29 NOTE — PLAN OF CARE
Pt up to bathroom.  at bedside. Pt and  verbalize understanding of discharge instructions. Pt awake alert and oriented. VSS.

## 2020-09-29 NOTE — DISCHARGE INSTRUCTIONS
Endoscopic Plantar Fasciotomy (EPF): After Surgery  Although you may feel fine when you are discharged, it is best to have someone drive you home. Your doctor may want you to rest and recover at home for a few days. Ask your doctor when you can start walking again. If a compression dressing is used to control swelling, you may need to wear a special surgical shoe. You may also need to wear a short leg brace for up to 3 weeks.  Recovering at home    Expect your foot to feel numb right after the surgery. Then, as the local anesthesia wears off, youll probably feel some pain. To limit pain and swelling, ice the foot for 10 to 15 minutes several times a day. Also, raise the foot above heart level as often as you can. If you've been given pain medicines, take them as directed  Your first post-op visit  Your doctor may want to see you the first 1 to 2 weeks after surgery. During this post-op visit, your incisions will be checked to make sure they are healing. The compression dressing may be replaced with a smaller surgical dressing. If this occurs, you can probably start wearing tennis shoes.  When to call your healthcare provider   Call your healthcare provider if you have any of these:  · The dressing is too tight or there is marked swelling or numbness of the toes  · Pain despite taking medicines  · More than a few drops of blood at an incision site  · Signs of an infection, including fever, chills, and redness near an incision  · Skin discoloration beyond the dressing   Date Last Reviewed: 7/1/2016  © 5466-2955 [employer]. [address] [place] [address], [place] [address]. All rights reserved. This information is not intended as a substitute for professional medical care. Always follow your healthcare professional's instructions.        Plantar Fasciitis  Plantar fasciitis is a painful swelling of the plantar fascia. The plantar fascia is a thick, fibrous layer of tissue. It covers the bones on the bottom of  your foot. And it supports the foot bones in an arched position.  This can happen gradually or suddenly. It usually affects one foot at a time. Heel pain can be sharp, like a knife sticking into the bottom of your foot. You may feel pain after exercising, long-distance jogging, stair climbing, long periods of standing, or after standing up.  Risk factors include: non-active lifestyle, arthritis, diabetes, obesity or recent weight gain, flat foot, high arch. Wearing high heels, loose shoes, or shoes with poor arch support for long periods of time adds to the risk. This problem is commonly found in runners and dancers. It also found in people who stand on hard surfaces for long periods of time.  Foot pain from this condition is usually worse in the morning. But it improves with walking. By the end of the day there may be a dull aching. Treatment requires short-term rest and controlling swelling. It may take up to 9 months before all symptoms go away. Rarely, a steroid injection into the foot, or surgery, may be needed.  Home care  · If you are overweight, lose weight to help healing.  · Choose supportive shoes with good arch support and shock absorbency. Replace athletic shoes when they become worn out. Dont walk or run barefoot.  · Premade or custom-fitted shoe inserts may be helpful. Inserts made of silicone seem to be the most effective. Custom-made inserts can be provided by a podiatrist or foot specialist, physical therapist, or orthopedist.  · Premade or custom-made night splints keep the heel stretched out while you sleep. They may prevent morning pain.  · Avoid activities that stress the feet: jogging, prolonged standing or walking, contact sports, etc.  · First thing in the morning and before sports, stretch the bottom of your feet. Gently flex your ankle so the toes move toward your knee.  · Icing may help control heel pain. Apply an ice pack to the heel for 10-20 minutes as a preventive. Or ice your heel  after a severe flare-up of symptoms. You may repeat this every 1-2 hours as needed.  · You may use over-the-counter pain medicine to control pain, unless another medicine was prescribed. Anti-inflammatory pain medicines, such as ibuprofen or naproxen, may work better than acetaminophen. If you have chronic liver or kidney disease or ever had a stomach ulcer or GI bleeding, talk with your healthcare provider before using these medicines.  Follow-up care  Follow up with your healthcare provider, physical therapist, or podiatrist or foot specialist as advised.  Call for an appointment if pain worsens or there is no relief after a few weeks of home treatment. Shoe inserts, a night splint, or a special boot may be required.  If X-rays were taken, you will be told of any new findings that may affect your care.  When to seek medical advice  Call your healthcare provider right away if any of these occur:  · Foot swelling  · Redness with increasing pain  Date Last Reviewed: 11/21/2015  © 1807-1878 [employer]. [address] [place] [address]. All rights reserved. This information is not intended as a substitute for professional medical care. Always follow your healthcare professional's instructions.        Discharge Instructions: After Your Surgery  Youve just had surgery. During surgery, you were given medicine called anesthesia to keep you relaxed and free of pain. After surgery, you may have some pain or nausea. This is common. Here are some tips for feeling better and getting well after surgery.     Stay on schedule with your medicine.   Going home  Your healthcare provider will show you how to take care of yourself when you go home. He or she will also answer your questions. Have an adult family member or friend drive you home. For the first 24 hours after your surgery:  · Do not drive or use heavy equipment.  · Do not make important decisions or sign legal papers.  · Do not drink  alcohol.  · Have someone stay with you, if needed. He or she can watch for problems and help keep you safe.  Be sure to go to all follow-up visits with your healthcare provider. And rest after your surgery for as long as your healthcare provider tells you to.  Coping with pain  If you have pain after surgery, pain medicine will help you feel better. Take it as told, before pain becomes severe. Also, ask your healthcare provider or pharmacist about other ways to control pain. This might be with heat, ice, or relaxation. And follow any other instructions your surgeon or nurse gives you.  Tips for taking pain medicine  To get the best relief possible, remember these points:  · Pain medicines can upset your stomach. Taking them with a little food may help.  · Most pain relievers taken by mouth need at least 20 to 30 minutes to start to work.  · Taking medicine on a schedule can help you remember to take it. Try to time your medicine so that you can take it before starting an activity. This might be before you get dressed, go for a walk, or sit down for dinner.  · Constipation is a common side effect of pain medicines. Call your healthcare provider before taking any medicines such as laxatives or stool softeners to help ease constipation. Also ask if you should skip any foods. Drinking lots of fluids and eating foods such as fruits and vegetables that are high in fiber can also help. Remember, do not take laxatives unless your surgeon has prescribed them.  · Drinking alcohol and taking pain medicine can cause dizziness and slow your breathing. It can even be deadly. Do not drink alcohol while taking pain medicine.  · Pain medicine can make you react more slowly to things. Do not drive or run machinery while taking pain medicine.  Your healthcare provider may tell you to take acetaminophen to help ease your pain. Ask him or her how much you are supposed to take each day. Acetaminophen or other pain relievers may interact  with your prescription medicines or other over-the-counter (OTC) medicines. Some prescription medicines have acetaminophen and other ingredients. Using both prescription and OTC acetaminophen for pain can cause you to overdose. Read the labels on your OTC medicines with care. This will help you to clearly know the list of ingredients, how much to take, and any warnings. It may also help you not take too much acetaminophen. If you have questions or do not understand the information, ask your pharmacist or healthcare provider to explain it to you before you take the OTC medicine.  Managing nausea  Some people have an upset stomach after surgery. This is often because of anesthesia, pain, or pain medicine, or the stress of surgery. These tips will help you handle nausea and eat healthy foods as you get better. If you were on a special food plan before surgery, ask your healthcare provider if you should follow it while you get better. These tips may help:  · Do not push yourself to eat. Your body will tell you when to eat and how much.  · Start off with clear liquids and soup. They are easier to digest.  · Next try semi-solid foods, such as mashed potatoes, applesauce, and gelatin, as you feel ready.  · Slowly move to solid foods. Dont eat fatty, rich, or spicy foods at first.  · Do not force yourself to have 3 large meals a day. Instead eat smaller amounts more often.  · Take pain medicines with a small amount of solid food, such as crackers or toast, to avoid nausea.     Call your surgeon if  · You still have pain an hour after taking medicine. The medicine may not be strong enough.  · You feel too sleepy, dizzy, or groggy. The medicine may be too strong.  · You have side effects like nausea, vomiting, or skin changes, such as rash, itching, or hives.       If you have obstructive sleep apnea  You were given anesthesia medicine during surgery to keep you comfortable and free of pain. After surgery, you may have more  apnea spells because of this medicine and other medicines you were given. The spells may last longer than usual.   At home:  · Keep using the continuous positive airway pressure (CPAP) device when you sleep. Unless your healthcare provider tells you not to, use it when you sleep, day or night. CPAP is a common device used to treat obstructive sleep apnea.  · Talk with your provider before taking any pain medicine, muscle relaxants, or sedatives. Your provider will tell you about the possible dangers of taking these medicines.  Date Last Reviewed: 12/1/2016  © 4581-7690 [employer]. [address] [place] [address], [place] [address]. All rights reserved. This information is not intended as a substitute for professional medical care. Always follow your healthcare professional's instructions.

## 2020-09-29 NOTE — ANESTHESIA POSTPROCEDURE EVALUATION
Anesthesia Post Evaluation    Patient: Brissa Araujo    Procedure(s) Performed: Procedure(s) (LRB):  FASCIOTOMY, PLANTAR and heel spur resection. (Right)    Final Anesthesia Type: general    Patient location during evaluation: PACU  Patient participation: Yes- Able to Participate  Level of consciousness: awake and awake and alert  Post-procedure vital signs: reviewed and stable  Pain management: adequate  Airway patency: patent    PONV status at discharge: No PONV  Anesthetic complications: no      Cardiovascular status: blood pressure returned to baseline  Respiratory status: unassisted and spontaneous ventilation  Hydration status: euvolemic  Follow-up not needed.          Vitals Value Taken Time   /76 09/29/20 0922   Temp 37 °C (98.6 °F) 09/29/20 0841   Pulse 76 09/29/20 0924   Resp 16 09/29/20 0845   SpO2 98 % 09/29/20 0924   Vitals shown include unvalidated device data.      Event Time   Out of Recovery 08:57:00         Pain/Arnaud Score: Arnaud Score: 10 (9/29/2020  9:35 AM)

## 2020-09-29 NOTE — TRANSFER OF CARE
Anesthesia Transfer of Care Note    Patient: Brissa Araujo    Procedure(s) Performed: Procedure(s) (LRB):  FASCIOTOMY, PLANTAR and heel spur resection. (Right)    Patient location: PACU    Anesthesia Type: general    Transport from OR: Transported from OR on room air with adequate spontaneous ventilation    Post pain: adequate analgesia    Post assessment: no apparent anesthetic complications    Post vital signs: stable    Level of consciousness: awake, alert and oriented    Nausea/Vomiting: no nausea/vomiting    Complications: none    Transfer of care protocol was followed      Last vitals:   Visit Vitals  /76   LMP 09/14/2020   Breastfeeding No

## 2020-09-29 NOTE — PLAN OF CARE
Dr. Jimenez at bedside. Pt verbalizes understanding of discharge instructions. Pt has ice behind knee and on top of foot. Pt foot elevated on pillow.

## 2020-09-29 NOTE — OP NOTE
[facility] - Periop Services  Operative Note     SUMMARY     Surgery Date: 9/29/2020       Pre-op Diagnosis:  Plantar fasciitis [M72.2]  Nontraumatic rupture of plantar fascia of right foot [M62.171]    Post-op Diagnosis:  Post-Op Diagnosis Codes:     * Plantar fasciitis [M72.2]     * Nontraumatic rupture of plantar fascia of right foot [M62.171]    Procedure(s) (LRB):  FASCIOTOMY, PLANTAR and heel spur resection. (Right)  Plantar fasciotomy in conjunction with heel spur resection procedure code 75189 right  Surgeon(s) and Role:     * Jean Jimenez, DPM - Primary      Estimated Blood Loss:  Less than 5 cc  Hemostasis:  Ankle tourniquet placed at 250 mm of mercury right foot for a period of 24 min  Materials sterile irrigant 3.0 Vicryl 4.0 Monocryl  Injectables 30 cc of Marcaine plain  Pathology none  Condition stable  Complications none    Anesthesia:  LMA in conjunction with local infiltrate equaling cc of 1% lidocaine plain    Description of the findings of the procedure:  Plantar fasciitis [M72.2]  Nontraumatic rupture of plantar fascia of right foot [M62.171]         Specimens (From admission, onward)    None           Procedure:  Patient was taken the operating room placed in supine position on the OR table attention was then directed towards the patient's right ankle where Webril cast padding was placed on the patient's right ankle as was an ankle tourniquet.  Following this patient was injected with the total of 20 cc of 1% lidocaine plain to create a local iced anesthetic in the plantar heel region right foot the area was then prepped and draped in the usual sterile manner.  Intraoperative fluoroscopy was utilized to plan the incision overlying the medial aspect of the plantar fascial insertion on the calcaneus right a longitudinal linear incision was created blunt dissection was used to carry down to the level of the plantar fascial ligament which was palpated both dorsal and  plantar the ligament was noted to be excessively thickened it was approximately 1 cm thick at the insertion point on the calcaneus there was extensive scar tissue surrounding the area and inflammatory fluid was noted in the region this area was thoroughly flushed irrigated dissection was achieved both dorsal and plantar to the plantar fascial attachment a Kocher clamp was applied to the plantar fascial ligament and was released from its normal insertion point on the calcaneus right again extensive scar tissue was noted upon release of the ligament a section of the plantar fascial ligament was then removed from the surgical field palpation of the plantar calcaneus revealed complete release of the plantar fascial ligament from its normal insertion point there was a ridge of bone spurring noted at the plantar fascial insertion this was read contoured with a hand rasp and bone spurs removed in this area intraoperative fluoroscopy was used turned during the procedure.  The section of plantar fascia that had been removed from the surgical field to perform the plantar fasciotomy was noted to have significant scar tissue surrounding it with fibrosis the surgical site was then flushed irrigated with copious amounts of sterile saline deep closure was achieved with 3.0 Vicryl in a simple interrupted fashion skin closure was achieved with 4.0 Monocryl in a simple interrupted fashion patient was then injected with an additional 30 cc of 0.5% Marcaine plain to create a long-term postoperative anesthetic ankle tourniquet was lowered and remove minimal bleeding noted Adaptic nonadhesive dressing sterile 4x4s ABD multiple rolls of Kerlix were applied patient was then placed in a lower leg fiberglass cast with her foot placed in a dorsiflex position to pull the remaining portion of the plantar fascia at the released site distally.  Patient left the operating room vital signs stable and vascular status having return to the patient's  normal preoperative levels patient will maintain complete nonweightbearing for a minimum three weeks.This note was created using [employer] voice recognition software that occasionally misinterpreted phrases or words.

## 2020-10-01 ENCOUNTER — OFFICE VISIT (OUTPATIENT)
Dept: PODIATRY | Facility: CLINIC | Age: 40
End: 2020-10-01
Payer: OTHER GOVERNMENT

## 2020-10-01 VITALS
SYSTOLIC BLOOD PRESSURE: 111 MMHG | WEIGHT: 135 LBS | HEART RATE: 93 BPM | BODY MASS INDEX: 21.69 KG/M2 | DIASTOLIC BLOOD PRESSURE: 72 MMHG | HEIGHT: 66 IN | TEMPERATURE: 98 F

## 2020-10-01 DIAGNOSIS — M62.171 NONTRAUMATIC RUPTURE OF PLANTAR FASCIA OF RIGHT FOOT: Primary | ICD-10-CM

## 2020-10-01 PROCEDURE — 99999 PR PBB SHADOW E&M-EST. PATIENT-LVL III: CPT | Mod: PBBFAC,,, | Performed by: PODIATRIST

## 2020-10-01 PROCEDURE — 99999 PR PBB SHADOW E&M-EST. PATIENT-LVL III: ICD-10-PCS | Mod: PBBFAC,,, | Performed by: PODIATRIST

## 2020-10-01 PROCEDURE — 99213 OFFICE O/P EST LOW 20 MIN: CPT | Mod: PBBFAC,PN | Performed by: PODIATRIST

## 2020-10-01 PROCEDURE — 99024 POSTOP FOLLOW-UP VISIT: CPT | Mod: ,,, | Performed by: PODIATRIST

## 2020-10-01 PROCEDURE — 99024 PR POST-OP FOLLOW-UP VISIT: ICD-10-PCS | Mod: ,,, | Performed by: PODIATRIST

## 2020-10-01 NOTE — LETTER
October 4, 2020      [address] [place] [address]           [facility] - Podiatry/Wound Care  [facility] [facility] [place] [address]  Phone: [phone]  Fax: [phone]          Patient: Brissa Araujo   MR Number: [id number]   YOB: 1980   Date of Visit: 10/1/2020       Dear [facility]:    Thank you for referring Brissa Araujo to me for evaluation. Attached you will find relevant portions of my assessment and plan of care.    If you have questions, please do not hesitate to call me. I look forward to following Brissa Araujo along with you.    Sincerely,    Jean Jimenez, NIRMAL    Enclosure  CC:  No Recipients    If you would like to receive this communication electronically, please contact [email or web address] or [phone] to request more information on [employer] access.    For providers and/or their staff who would like to refer a patient to [facility], please contact us through our one-stop-shop provider referral line, [facility], at [phone].    If you feel you have received this communication in error or would no longer like to receive these types of communications, please e-mail [email or web address]

## 2020-10-05 NOTE — PROGRESS NOTES
"Brissa Araujo is a 40 y.o. female patient.   1. Nontraumatic rupture of plantar fascia of right foot      History reviewed. No pertinent past medical history.  No past surgical history pertinent negatives on file.  Scheduled Meds:  Continuous Infusions:  PRN Meds:    Review of patient's allergies indicates:  No Known Allergies  There are no hospital problems to display for this patient.    Blood pressure 111/72, pulse 93, temperature 98.4 °F (36.9 °C), height 5' 6" (1.676 m), weight 61.2 kg (135 lb), last menstrual period 09/14/2020.    Subjective  Objective   Assessment & Plan     Patient presents status post plantar fasciotomy heel spur resection right.  Patient states she did have a lot of pain the night of surgery in the next day she is doing a lot better she is tolerating the pain medication but taking it sparingly she is taking her Bactrim and tolerating that as well as her daily aspirin.  Patient's cast is fitting well both distally and proximally patient is currently afebrile and in no acute distress capillary fill time is immediate distal digits right.  Patient advised to maintain her nonweightbearing status I plan to see her for follow-up in 2 weeks plan to take the patient out of the cast and allow her to go into the fracture boot however she will not be able to bear weight for likely 2 weeks when she goes into the boot but at least she will be able to take the boot off.  Patient has been advised to contact us with any problems questions or concerns prior to scheduled follow-up.This note was created using [employer] voice recognition software that occasionally misinterpreted phrases or words.  Jean Jimenez DPM  10/4/2020  "

## 2020-10-08 ENCOUNTER — TELEPHONE (OUTPATIENT)
Dept: PODIATRY | Facility: CLINIC | Age: 40
End: 2020-10-08

## 2020-10-08 NOTE — TELEPHONE ENCOUNTER
Pt stated she has had numbness to the fifth digit since surgery and area is a little red still. No temperature, no swelling and area is not warm to touch. She wanted to be sure this was normal or is something she should be worried about and wanted to check with Dr. Jimenez.

## 2020-10-08 NOTE — TELEPHONE ENCOUNTER
----- Message from Giovanna Burgos sent at 10/8/2020  1:01 PM CDT -----  Type: Needs Medical Advice  Who Called:  pt  Symptoms (please be specific):    How long has patient had these symptoms:    Pharmacy name and phone #:    Best Call Back Number:   Additional Information: requesting a call back in regards to numbness in her right pinky toe. Please contact pt

## 2020-10-14 ENCOUNTER — OFFICE VISIT (OUTPATIENT)
Dept: PODIATRY | Facility: CLINIC | Age: 40
End: 2020-10-14
Payer: OTHER GOVERNMENT

## 2020-10-14 VITALS
WEIGHT: 135 LBS | DIASTOLIC BLOOD PRESSURE: 76 MMHG | BODY MASS INDEX: 21.69 KG/M2 | TEMPERATURE: 98 F | SYSTOLIC BLOOD PRESSURE: 118 MMHG | HEART RATE: 78 BPM | HEIGHT: 66 IN

## 2020-10-14 DIAGNOSIS — M62.171 NONTRAUMATIC RUPTURE OF PLANTAR FASCIA OF RIGHT FOOT: Primary | ICD-10-CM

## 2020-10-14 PROCEDURE — 99024 POSTOP FOLLOW-UP VISIT: CPT | Mod: ,,, | Performed by: PODIATRIST

## 2020-10-14 PROCEDURE — 99024 PR POST-OP FOLLOW-UP VISIT: ICD-10-PCS | Mod: ,,, | Performed by: PODIATRIST

## 2020-10-14 PROCEDURE — 99213 OFFICE O/P EST LOW 20 MIN: CPT | Mod: PBBFAC | Performed by: PODIATRIST

## 2020-10-14 PROCEDURE — 99999 PR PBB SHADOW E&M-EST. PATIENT-LVL III: ICD-10-PCS | Mod: PBBFAC,,, | Performed by: PODIATRIST

## 2020-10-14 PROCEDURE — 99999 PR PBB SHADOW E&M-EST. PATIENT-LVL III: CPT | Mod: PBBFAC,,, | Performed by: PODIATRIST

## 2020-10-17 NOTE — PROGRESS NOTES
"Brissa Araujo is a 40 y.o. female patient.   1. Nontraumatic rupture of plantar fascia of right foot      History reviewed. No pertinent past medical history.  No past surgical history pertinent negatives on file.  Scheduled Meds:  Continuous Infusions:  PRN Meds:    Review of patient's allergies indicates:  No Known Allergies  There are no hospital problems to display for this patient.    Blood pressure 118/76, pulse 78, temperature 98 °F (36.7 °C), height 5' 6" (1.676 m), weight 61.2 kg (135 lb), last menstrual period 09/14/2020.        Subjective  Objective:  Vital signs (most recent): Blood pressure 118/76, pulse 78, temperature 98 °F (36.7 °C), height 5' 6" (1.676 m), weight 61.2 kg (135 lb), last menstrual period 09/14/2020.     Assessment & Plan     Patient presents status post plantar fasciotomy heel spur resection right.  Patient states she is doing well she is having some numbness and some discomfort on the outside of her right foot I advised her it is probably from the cast rubbing overall the area looks very good she is going to start back on her Celebrex 200 mg twice a day she has finished her antibiotic she is going to continue taking her daily aspirin as directed I did put the patient in a fracture boot however she is not to bear weight yet on the right foot a light dressing was applied the incision is well healing with no signs of infection I have advised the patient in 2 weeks I will likely allow her to start getting the area wet and start weight-bearing however I do not want her weight-bearing until I see her for follow-up patient was in understanding and agreement with everything discussed today.  This note was created using [employer] voice recognition software that occasionally misinterpreted phrases or words.  Jean Jimenez DPM  10/17/2020    "

## 2020-10-26 ENCOUNTER — OFFICE VISIT (OUTPATIENT)
Dept: PODIATRY | Facility: CLINIC | Age: 40
End: 2020-10-26
Payer: OTHER GOVERNMENT

## 2020-10-26 VITALS
TEMPERATURE: 98 F | HEART RATE: 67 BPM | OXYGEN SATURATION: 99 % | WEIGHT: 135 LBS | RESPIRATION RATE: 19 BRPM | BODY MASS INDEX: 21.69 KG/M2 | HEIGHT: 66 IN | DIASTOLIC BLOOD PRESSURE: 67 MMHG | SYSTOLIC BLOOD PRESSURE: 105 MMHG

## 2020-10-26 DIAGNOSIS — M62.171 NONTRAUMATIC RUPTURE OF PLANTAR FASCIA OF RIGHT FOOT: Primary | ICD-10-CM

## 2020-10-26 PROCEDURE — 99999 PR PBB SHADOW E&M-EST. PATIENT-LVL III: CPT | Mod: PBBFAC,,, | Performed by: PODIATRIST

## 2020-10-26 PROCEDURE — 99999 PR PBB SHADOW E&M-EST. PATIENT-LVL III: ICD-10-PCS | Mod: PBBFAC,,, | Performed by: PODIATRIST

## 2020-10-26 PROCEDURE — 99024 POSTOP FOLLOW-UP VISIT: CPT | Mod: ,,, | Performed by: PODIATRIST

## 2020-10-26 PROCEDURE — 99024 PR POST-OP FOLLOW-UP VISIT: ICD-10-PCS | Mod: ,,, | Performed by: PODIATRIST

## 2020-10-26 PROCEDURE — 99213 OFFICE O/P EST LOW 20 MIN: CPT | Mod: PBBFAC | Performed by: PODIATRIST

## 2020-10-28 NOTE — PROGRESS NOTES
"    Brissa Araujo is a 40 y.o. female patient.   1. Nontraumatic rupture of plantar fascia of right foot      History reviewed. No pertinent past medical history.  No past surgical history pertinent negatives on file.  Scheduled Meds:  Continuous Infusions:  PRN Meds:    Review of patient's allergies indicates:  No Known Allergies  There are no hospital problems to display for this patient.    Blood pressure 105/67, pulse 67, temperature 97.8 °F (36.6 °C), temperature source Temporal, resp. rate 19, height 5' 6" (1.676 m), weight 61.2 kg (135 lb), SpO2 99 %.        Subjective  Objective:  Vital signs (most recent): Blood pressure 105/67, pulse 67, temperature 97.8 °F (36.6 °C), temperature source Temporal, resp. rate 19, height 5' 6" (1.676 m), weight 61.2 kg (135 lb), SpO2 99 %.     Assessment & Plan     Patient presents status post plantar fasciotomy heel spur resection right.  Patient relates she still having some numbness on the outside of her right foot but is not having any current consistent pain and not having a pain any pain in the site of the surgery itself.  Patient states she did try to put some weight on her foot and had a significant amount of pain the other day I have advised her at today after evaluating her foot I am going to allow her start to bear weight in the fracture boot as tolerated she must have her arch support in the fracture boot I have advised her she can also get her right foot wet now she is not to scrub or apply any cream or lotion to the incision site but can get the area wet to dry it thoroughly apply Betadine right after she gets out of the bathtub or shower to make sure that the incision site does not become too moist and keep a light dressing on the area to prevent irritation.  I will plan to follow up with the patient in 2 weeks I have advised her she is going to have pain she is going to have swelling that part of her recovery and breakdown of the scar tissue the presents " following this type of procedure but this will improve over time.  Patient was in understanding and agreement with this plan follow-up will be 2 weeks patient advised to contact us with any problems questions or concerns.  Patient did have some ecchymosis on the plantar aspect of the right foot this is consistent with her current postoperative status no signs of infection noted.  This note was created using [employer] voice recognition software that occasionally misinterpreted phrases or words.  Jean Jimenez DPM  10/28/2020

## 2020-11-12 ENCOUNTER — OFFICE VISIT (OUTPATIENT)
Dept: PODIATRY | Facility: CLINIC | Age: 40
End: 2020-11-12
Payer: OTHER GOVERNMENT

## 2020-11-12 VITALS
TEMPERATURE: 98 F | HEIGHT: 66 IN | HEART RATE: 77 BPM | SYSTOLIC BLOOD PRESSURE: 108 MMHG | BODY MASS INDEX: 22.5 KG/M2 | WEIGHT: 140 LBS | DIASTOLIC BLOOD PRESSURE: 70 MMHG

## 2020-11-12 DIAGNOSIS — M62.171 NONTRAUMATIC RUPTURE OF PLANTAR FASCIA OF RIGHT FOOT: Primary | ICD-10-CM

## 2020-11-12 PROCEDURE — 99999 PR PBB SHADOW E&M-EST. PATIENT-LVL III: ICD-10-PCS | Mod: PBBFAC,,, | Performed by: PODIATRIST

## 2020-11-12 PROCEDURE — 99024 POSTOP FOLLOW-UP VISIT: CPT | Mod: ,,, | Performed by: PODIATRIST

## 2020-11-12 PROCEDURE — 99024 PR POST-OP FOLLOW-UP VISIT: ICD-10-PCS | Mod: ,,, | Performed by: PODIATRIST

## 2020-11-12 PROCEDURE — 99999 PR PBB SHADOW E&M-EST. PATIENT-LVL III: CPT | Mod: PBBFAC,,, | Performed by: PODIATRIST

## 2020-11-12 PROCEDURE — 99213 OFFICE O/P EST LOW 20 MIN: CPT | Mod: PBBFAC,PN | Performed by: PODIATRIST

## 2020-11-12 NOTE — PROGRESS NOTES
"        Brissa Araujo is a 40 y.o. female patient.   1. Nontraumatic rupture of plantar fascia of right foot      History reviewed. No pertinent past medical history.  No past surgical history pertinent negatives on file.  Scheduled Meds:  Continuous Infusions:  PRN Meds:    Review of patient's allergies indicates:  No Known Allergies  There are no hospital problems to display for this patient.    Blood pressure 108/70, pulse 77, temperature 98.1 °F (36.7 °C), height 5' 6" (1.676 m), weight 63.5 kg (140 lb).        Subjective  Objective:  Vital signs (most recent): Blood pressure 108/70, pulse 77, temperature 98.1 °F (36.7 °C), height 5' 6" (1.676 m), weight 63.5 kg (140 lb).     Assessment & Plan     Patient presents status post plantar fasciotomy heel spur resection right.  Patient states she is doing well she is bearing weight comfortably with the fracture boot and her arch support in the fracture boot she continues to take her Celebrex as directed.  Patient states she is very anxious to discontinue the fracture boot the incision site looks better it has got a well-formed scab on the area I do want her to continue to apply Betadine to the area daily she does not need to keep it covered.  I am going to allow the patient to transition into a normal shoe as tolerated she understands the 1st 7-10 days she may go back and forth part of the day in the shoe part of the day in the boot as she makes a full transition she understands she is going to have increased inflammation discomfort as she goes to this next phase of her postoperative recovery period patient will continue her Celebrex as directed I have recommended ice and elevation as needed patient is to gradually return to activity I am not recommending any jogging at this time she can try some light walking as tolerated I will see the patient for follow-up and re-evaluation in 3 weeks if she has any problems questions or concerns prior that time she is to contact " us.  Patient advised I feel she is doing very well and progressing through her postoperative care she still has numbness on the lateral portion of her right foot I believe this is related to scar tissue that is built up on the bottom of the foot due to the surgery and she has tenderness on the bottom of the heel but states this is different than it was prior to surgery this is postoperative tenderness.  Patient advised her inflammation and discomfort will decrease as she recovers I am deferring physical therapy at this time and will determine whether not this is necessary at some time in the future.  This note was created using [employer] voice recognition software that occasionally misinterpreted phrases or words.  Jean Jimenez DPM  11/12/2020

## 2020-11-12 NOTE — LETTER
November 12, 2020      [address] [place] [address]           [facility] - Podiatry/Wound Care  [facility] [facility] [place] [address]  Phone: [phone]  Fax: [phone]          Patient: Brissa Araujo   MR Number: [id number]   YOB: 1980   Date of Visit: 11/12/2020       Dear [facility]:    Thank you for referring Brissa Araujo to me for evaluation. Attached you will find relevant portions of my assessment and plan of care.    If you have questions, please do not hesitate to call me. I look forward to following Brissa Araujo along with you.    Sincerely,    Jean Jimenez, NIRMAL    Enclosure  CC:  No Recipients    If you would like to receive this communication electronically, please contact [email or web address] or [phone] to request more information on [employer] access.    For providers and/or their staff who would like to refer a patient to [facility], please contact us through our one-stop-shop provider referral line, [facility], at [phone].    If you feel you have received this communication in error or would no longer like to receive these types of communications, please e-mail [email or web address]

## 2020-12-03 ENCOUNTER — OFFICE VISIT (OUTPATIENT)
Dept: PODIATRY | Facility: CLINIC | Age: 40
End: 2020-12-03
Payer: OTHER GOVERNMENT

## 2020-12-03 VITALS
OXYGEN SATURATION: 99 % | BODY MASS INDEX: 22.5 KG/M2 | RESPIRATION RATE: 12 BRPM | TEMPERATURE: 98 F | WEIGHT: 140 LBS | HEART RATE: 76 BPM | DIASTOLIC BLOOD PRESSURE: 73 MMHG | HEIGHT: 66 IN | SYSTOLIC BLOOD PRESSURE: 114 MMHG

## 2020-12-03 DIAGNOSIS — M62.171 NONTRAUMATIC RUPTURE OF PLANTAR FASCIA OF RIGHT FOOT: Primary | ICD-10-CM

## 2020-12-03 PROCEDURE — 99024 PR POST-OP FOLLOW-UP VISIT: ICD-10-PCS | Mod: ,,, | Performed by: PODIATRIST

## 2020-12-03 PROCEDURE — 99213 OFFICE O/P EST LOW 20 MIN: CPT | Mod: PBBFAC,PN | Performed by: PODIATRIST

## 2020-12-03 PROCEDURE — 99999 PR PBB SHADOW E&M-EST. PATIENT-LVL III: CPT | Mod: PBBFAC,,, | Performed by: PODIATRIST

## 2020-12-03 PROCEDURE — 99999 PR PBB SHADOW E&M-EST. PATIENT-LVL III: ICD-10-PCS | Mod: PBBFAC,,, | Performed by: PODIATRIST

## 2020-12-03 PROCEDURE — 99024 POSTOP FOLLOW-UP VISIT: CPT | Mod: ,,, | Performed by: PODIATRIST

## 2020-12-03 NOTE — LETTER
December 3, 2020      [facility]  [address] [facility] [place] [address]

## 2020-12-06 NOTE — PROGRESS NOTES
"Brissa Araujo is a 40 y.o. female patient.   1. Nontraumatic rupture of plantar fascia of right foot      History reviewed. No pertinent past medical history.  No past surgical history pertinent negatives on file.  Scheduled Meds:  Continuous Infusions:  PRN Meds:    Review of patient's allergies indicates:  No Known Allergies  There are no hospital problems to display for this patient.    Blood pressure 114/73, pulse 76, temperature 98.1 °F (36.7 °C), temperature source Temporal, resp. rate 12, height 5' 6" (1.676 m), weight 63.5 kg (140 lb), SpO2 99 %.                Subjective  Objective:  Vital signs (most recent): Blood pressure 114/73, pulse 76, temperature 98.1 °F (36.7 °C), temperature source Temporal, resp. rate 12, height 5' 6" (1.676 m), weight 63.5 kg (140 lb), SpO2 99 %.     Assessment & Plan     Patient presents status post plantar fasciotomy heel spur resection right.  Patient states she is doing very well she is having very limited discomfort she has increased activity slowly her pain has decreased she still has a lumpy type sensation on the bottom of her heel and midfoot this is related to scar tissue and I advised advised advised her that this will breakdown over time.  Patient's incision is well healed overall the patient is doing fantastic she is really advanced well improving well especially when she told me today that she last took Celebrex about 3 weeks ago she states she did not get it refilled so she is not been taking any anti-inflammatory all and her pain has been very limited I did advised the patient I intended for her to stay on the Celebrex but she states she really did not feel that she needed it I did advised her that she may want to get the refill as she gradually return to activity she may have some increased inflammation some discomfort and she needs to be prepared for that I am going to let the patient's start to increase activity she can start working out doing a little bit " more she is not not to start jogging at the toe and she needs to make sure she is wearing her power steps at all times.  Will consider physical therapy as necessary it does not appear to be necessary at this time plan follow-up will be 1 month patient advised to contact us with any problems questions or concerns prior to that time.  This note was created using [employer] voice recognition software that occasionally misinterpreted phrases or words.  Jean Jimenez DPM  12/6/2020

## 2021-01-07 ENCOUNTER — OFFICE VISIT (OUTPATIENT)
Dept: PODIATRY | Facility: CLINIC | Age: 41
End: 2021-01-07
Payer: OTHER GOVERNMENT

## 2021-01-07 VITALS
OXYGEN SATURATION: 99 % | DIASTOLIC BLOOD PRESSURE: 70 MMHG | TEMPERATURE: 98 F | WEIGHT: 140 LBS | RESPIRATION RATE: 12 BRPM | HEART RATE: 84 BPM | SYSTOLIC BLOOD PRESSURE: 113 MMHG | BODY MASS INDEX: 22.5 KG/M2 | HEIGHT: 66 IN

## 2021-01-07 DIAGNOSIS — M72.2 PLANTAR FASCIITIS: Primary | ICD-10-CM

## 2021-01-07 PROCEDURE — 99999 PR PBB SHADOW E&M-EST. PATIENT-LVL III: ICD-10-PCS | Mod: PBBFAC,,, | Performed by: PODIATRIST

## 2021-01-07 PROCEDURE — 99999 PR PBB SHADOW E&M-EST. PATIENT-LVL III: CPT | Mod: PBBFAC,,, | Performed by: PODIATRIST

## 2021-01-07 PROCEDURE — 99213 OFFICE O/P EST LOW 20 MIN: CPT | Mod: S$PBB,,, | Performed by: PODIATRIST

## 2021-01-07 PROCEDURE — 99213 OFFICE O/P EST LOW 20 MIN: CPT | Mod: PBBFAC,PN | Performed by: PODIATRIST

## 2021-01-07 PROCEDURE — 99213 PR OFFICE/OUTPT VISIT, EST, LEVL III, 20-29 MIN: ICD-10-PCS | Mod: S$PBB,,, | Performed by: PODIATRIST

## (undated) DEVICE — CAST TAPE DELTA PLUS 3X4 WHITE

## (undated) DEVICE — SLEEVE SCD EXPRESS CALF MEDIUM

## (undated) DEVICE — PAD ABD 8X10 STERILE

## (undated) DEVICE — SPONGE DERMACEA GAUZE 4X4

## (undated) DEVICE — GLOVE BIOGEL PI ORTHO PRO 7.5

## (undated) DEVICE — ELECTRODE REM PLYHSV RETURN 9

## (undated) DEVICE — COVER LIGHT HANDLE 80/CA

## (undated) DEVICE — GLOVE BIOGEL ORTHOPEDIC 6.5

## (undated) DEVICE — SEE MEDLINE ITEM 156964

## (undated) DEVICE — SUT 3-0 VICRYL / SH (J416)

## (undated) DEVICE — DECANTER VIAL ASEPTIC TRANSFER

## (undated) DEVICE — JELLY KY LUBRICATING 5G PACKET

## (undated) DEVICE — SEE MEDLINE ITEM 152522

## (undated) DEVICE — GAUZE SPONGE 4X4 12PLY

## (undated) DEVICE — DRAPE MINI C-ARM

## (undated) DEVICE — DRESSING N ADH OIL EMUL 3X3

## (undated) DEVICE — SUT MONOCRYL 4-0 PS-2

## (undated) DEVICE — GLOVE PI ULTRA TOUCH G SURGEON

## (undated) DEVICE — TOURNIQUET SB QC SP 18X4IN

## (undated) DEVICE — SEE MEDLINE ITEM 146298

## (undated) DEVICE — SOL 9P NACL IRR PIC IL

## (undated) DEVICE — BLADE SURG #15 CARBON STEEL

## (undated) DEVICE — CANISTER SUCTION 3000CC